# Patient Record
Sex: FEMALE | Race: WHITE | NOT HISPANIC OR LATINO | Employment: UNEMPLOYED
[De-identification: names, ages, dates, MRNs, and addresses within clinical notes are randomized per-mention and may not be internally consistent; named-entity substitution may affect disease eponyms.]

---

## 2017-10-15 ENCOUNTER — HEALTH MAINTENANCE LETTER (OUTPATIENT)
Age: 52
End: 2017-10-15

## 2020-02-24 ENCOUNTER — HEALTH MAINTENANCE LETTER (OUTPATIENT)
Age: 55
End: 2020-02-24

## 2020-10-20 ENCOUNTER — AMBULATORY - HEALTHEAST (OUTPATIENT)
Dept: CARDIOLOGY | Facility: CLINIC | Age: 55
End: 2020-10-20

## 2020-10-22 ENCOUNTER — OFFICE VISIT - HEALTHEAST (OUTPATIENT)
Dept: CARDIOLOGY | Facility: CLINIC | Age: 55
End: 2020-10-22

## 2020-10-22 ENCOUNTER — COMMUNICATION - HEALTHEAST (OUTPATIENT)
Dept: TELEHEALTH | Facility: CLINIC | Age: 55
End: 2020-10-22

## 2020-10-22 DIAGNOSIS — I49.3 PVC (PREMATURE VENTRICULAR CONTRACTION): ICD-10-CM

## 2020-10-22 DIAGNOSIS — I34.1 MITRAL VALVE PROLAPSE: ICD-10-CM

## 2020-10-22 RX ORDER — SUMATRIPTAN 25 MG/1
25 TABLET, FILM COATED ORAL
Status: SHIPPED | COMMUNITY
Start: 2020-07-22

## 2020-10-22 ASSESSMENT — MIFFLIN-ST. JEOR: SCORE: 1171.95

## 2020-10-26 ENCOUNTER — HOSPITAL ENCOUNTER (OUTPATIENT)
Dept: CARDIOLOGY | Facility: HOSPITAL | Age: 55
Discharge: HOME | End: 2020-10-26
Attending: INTERNAL MEDICINE

## 2020-10-29 ENCOUNTER — COMMUNICATION - HEALTHEAST (OUTPATIENT)
Dept: CARDIOLOGY | Facility: CLINIC | Age: 55
End: 2020-10-29

## 2020-10-30 ENCOUNTER — AMBULATORY - HEALTHEAST (OUTPATIENT)
Dept: CARDIOLOGY | Facility: CLINIC | Age: 55
End: 2020-10-30

## 2020-10-30 DIAGNOSIS — I49.3 PVC (PREMATURE VENTRICULAR CONTRACTION): ICD-10-CM

## 2020-10-30 DIAGNOSIS — R06.00 DYSPNEA: ICD-10-CM

## 2020-11-02 ENCOUNTER — HOSPITAL ENCOUNTER (OUTPATIENT)
Dept: CARDIOLOGY | Facility: CLINIC | Age: 55
Discharge: HOME | End: 2020-11-02
Attending: INTERNAL MEDICINE

## 2020-11-02 DIAGNOSIS — R06.00 DYSPNEA: ICD-10-CM

## 2020-11-02 DIAGNOSIS — I49.3 PVC (PREMATURE VENTRICULAR CONTRACTION): ICD-10-CM

## 2020-11-02 LAB
CV STRESS CURRENT BP HE: NORMAL
CV STRESS CURRENT HR HE: 104
CV STRESS CURRENT HR HE: 104
CV STRESS CURRENT HR HE: 108
CV STRESS CURRENT HR HE: 108
CV STRESS CURRENT HR HE: 109
CV STRESS CURRENT HR HE: 110
CV STRESS CURRENT HR HE: 110
CV STRESS CURRENT HR HE: 113
CV STRESS CURRENT HR HE: 117
CV STRESS CURRENT HR HE: 118
CV STRESS CURRENT HR HE: 118
CV STRESS CURRENT HR HE: 124
CV STRESS CURRENT HR HE: 125
CV STRESS CURRENT HR HE: 132
CV STRESS CURRENT HR HE: 133
CV STRESS CURRENT HR HE: 134
CV STRESS CURRENT HR HE: 140
CV STRESS CURRENT HR HE: 141
CV STRESS CURRENT HR HE: 148
CV STRESS CURRENT HR HE: 153
CV STRESS CURRENT HR HE: 153
CV STRESS CURRENT HR HE: 157
CV STRESS CURRENT HR HE: 167
CV STRESS CURRENT HR HE: 171
CV STRESS CURRENT HR HE: 80
CV STRESS CURRENT HR HE: 83
CV STRESS CURRENT HR HE: 97
CV STRESS DEVIATION TIME HE: NORMAL
CV STRESS ECHO PERCENT HR HE: NORMAL
CV STRESS EXERCISE STAGE HE: NORMAL
CV STRESS FINAL RESTING BP HE: NORMAL
CV STRESS FINAL RESTING HR HE: 97
CV STRESS MAX HR HE: 173
CV STRESS MAX TREADMILL GRADE HE: 14
CV STRESS MAX TREADMILL SPEED HE: 3.4
CV STRESS PEAK DIA BP HE: NORMAL
CV STRESS PEAK SYS BP HE: NORMAL
CV STRESS PHASE HE: NORMAL
CV STRESS PROTOCOL HE: NORMAL
CV STRESS RESTING PT POSITION HE: NORMAL
CV STRESS RESTING PT POSITION HE: NORMAL
CV STRESS ST DEVIATION AMOUNT HE: NORMAL
CV STRESS ST DEVIATION ELEVATION HE: NORMAL
CV STRESS ST EVELATION AMOUNT HE: NORMAL
CV STRESS TEST TYPE HE: NORMAL
CV STRESS TOTAL STAGE TIME MIN 1 HE: NORMAL
ECHO EJECTION FRACTION ESTIMATED: 60 %
RATE PRESSURE PRODUCT: NORMAL
STRESS ECHO BASELINE DIASTOLIC HE: 91
STRESS ECHO BASELINE HR: 82
STRESS ECHO BASELINE SYSTOLIC BP: 133
STRESS ECHO CALCULATED PERCENT HR: 105 %
STRESS ECHO LAST STRESS DIASTOLIC BP: 84
STRESS ECHO LAST STRESS HR: 171
STRESS ECHO LAST STRESS SYSTOLIC BP: 150
STRESS ECHO POST ESTIMATED WORKLOAD: 9.7
STRESS ECHO POST EXERCISE DUR MIN: 8
STRESS ECHO POST EXERCISE DUR SEC: 4
STRESS ECHO TARGET HR: 165

## 2020-11-05 ENCOUNTER — AMBULATORY - HEALTHEAST (OUTPATIENT)
Dept: CARDIOLOGY | Facility: CLINIC | Age: 55
End: 2020-11-05

## 2020-11-05 DIAGNOSIS — I49.3 PVC'S (PREMATURE VENTRICULAR CONTRACTIONS): ICD-10-CM

## 2020-11-09 ENCOUNTER — COMMUNICATION - HEALTHEAST (OUTPATIENT)
Dept: CARDIOLOGY | Facility: CLINIC | Age: 55
End: 2020-11-09

## 2020-11-30 ENCOUNTER — OFFICE VISIT - HEALTHEAST (OUTPATIENT)
Dept: CARDIOLOGY | Facility: CLINIC | Age: 55
End: 2020-11-30

## 2020-11-30 DIAGNOSIS — I49.3 FREQUENT UNIFOCAL PREMATURE VENTRICULAR CONTRACTIONS: ICD-10-CM

## 2020-11-30 DIAGNOSIS — I34.1 MITRAL VALVE POSTERIOR LEAFLET PROLAPSE: ICD-10-CM

## 2020-12-13 ENCOUNTER — HEALTH MAINTENANCE LETTER (OUTPATIENT)
Age: 55
End: 2020-12-13

## 2021-04-17 ENCOUNTER — HEALTH MAINTENANCE LETTER (OUTPATIENT)
Age: 56
End: 2021-04-17

## 2021-05-10 ENCOUNTER — COMMUNICATION - HEALTHEAST (OUTPATIENT)
Dept: CARDIOLOGY | Facility: CLINIC | Age: 56
End: 2021-05-10

## 2021-05-10 DIAGNOSIS — I49.3 PVC'S (PREMATURE VENTRICULAR CONTRACTIONS): ICD-10-CM

## 2021-05-10 RX ORDER — METOPROLOL SUCCINATE 25 MG/1
TABLET, EXTENDED RELEASE ORAL
Qty: 30 TABLET | Refills: 5 | Status: SHIPPED | OUTPATIENT
Start: 2021-05-10 | End: 2022-09-16

## 2021-06-01 ENCOUNTER — RECORDS - HEALTHEAST (OUTPATIENT)
Dept: ADMINISTRATIVE | Facility: CLINIC | Age: 56
End: 2021-06-01

## 2021-06-04 VITALS
SYSTOLIC BLOOD PRESSURE: 132 MMHG | WEIGHT: 134 LBS | HEIGHT: 63 IN | RESPIRATION RATE: 12 BRPM | DIASTOLIC BLOOD PRESSURE: 84 MMHG | BODY MASS INDEX: 23.74 KG/M2 | HEART RATE: 64 BPM

## 2021-06-12 NOTE — TELEPHONE ENCOUNTER
Rec'd return call from patient who stated she has noted that her cellulite dimples on her legs and lower abd seem to be decreasing which she had mentioned to  but it was a good report because she is pleased about it.  Patient does no weigh herself daily and does not feel she has gained wt and denies dyspnea, dizziness, increased fatigue after starting Metoprolol Succ - confirmed EP consult on 11-30-20 - patient agreed to call during interim if she experiences any sx that were previously reviewed.  mg

## 2021-06-12 NOTE — TELEPHONE ENCOUNTER
----- Message from Yvonne Carmona V sent at 11/9/2020  3:19 PM CST -----  Regarding: METOPROLOL  I spoke with Justine today to schedule a consult with Dr. Mack per Dr. Yusuf.  During our conversation she brought up that since she has taking her metoprolol she has has lost some water weight and cannot see the dimples on her thighs. She said that she has not been urinating more than usual.  She wanted to make sure that it was nothing to be concerned about.  I told her I would reach out to Dr. Yusuf's nurse and that she would reach out to her if it was something to be concerned about.

## 2021-06-13 NOTE — PROGRESS NOTES
Review of systems are within normal limits.    Feels much better since starting Metoprolol, not feeling palpitations anymore.    Unable to check b/p

## 2021-06-13 NOTE — PATIENT INSTRUCTIONS - HE
Justine Viera    It was a pleasure to see you today for a clinic visit.    You have frequent ventricular premature beats from the left ventricular outflow tract which are not serious or life-threatening.  Symptoms have responded well to low-dose metoprolol.    Continue metoprolol succinate 25 mg daily for 3 months.  If symptoms are well controlled you can stop or taper metoprolol at that time.  If symptoms recur I would recommend restarting metoprolol.  An echocardiogram is ordered in 1 year to recheck pumping function  Follow up appointment: Dr. Mack 1 week after echo test    Contact Dalila at [phone] with questions or concerns.    Vince Mack MD

## 2021-06-16 PROBLEM — I34.1 MITRAL VALVE POSTERIOR LEAFLET PROLAPSE: Status: ACTIVE | Noted: 2020-11-30

## 2021-06-16 PROBLEM — I49.3 FREQUENT UNIFOCAL PREMATURE VENTRICULAR CONTRACTIONS: Status: ACTIVE | Noted: 2020-11-30

## 2021-06-29 NOTE — PROGRESS NOTES
Progress Notes by Brooke Yusuf MD at 10/22/2020  2:00 PM     Author: Brooke Yusuf MD Service: -- Author Type: Physician    Filed: 10/22/2020  2:48 PM Encounter Date: 10/22/2020 Status: Signed    : Brooke Yusuf MD (Physician)           Thank you, Dr. Oro, for asking us to see Justine Viera at the [facility].      Assessment/Recommendations   Assessment:    1.  Mitral valve prolapse/mitral regurgitation: Mild on last assessment 2016  2.  Symptomatic premature ventricular contractions  3.  Paroxysmal SVT: History of SVT back in 2013 EP study was unable to induce arrhythmia.  Reported either AF or A. tach.    Plan:  1.  Echocardiogram to reevaluate mitral valve disease and left ventricular ejection fraction  2.  24-hour Holter monitor to evaluate burden of PVCs  3.  Follow-up in a year       History of Present Illness    Ms. Justine Viera is a 55 y.o. female with history of symptomatic premature ventricular contractions, atrial tachycardia/A. fib, mild posterior leaflet mitral valve prolapse/mild mitral regurgitation who is here to establish care.  About a month ago her family developed URI symptoms.  They were Covid negative.  Following this over the past 2 weeks she is now felt recurrent palpitations and shortness of breath at times.  This feels similar to her symptoms that she had a few years ago when she underwent evaluation.  In 2013 she underwent an EP study by .  They were unable to induce VT or SVT.  He reviewed the Holter and reported that he felt that it was most consistent with A. fib or atrial tachycardia.  In 2016 she underwent an echocardiogram and a cardiac MRI which showed preserved left ventricular systolic function with myxomatous thickened mitral valve leaflets with mild posterior leaflet mitral valve prolapse and mild mitral regurgitation.  She was last seen by Dr. Aguilar in 2017.    She reports that more recently  she has developed irregular skipped heartbeats consistent with her symptoms of PVCs that occur on a daily basis.  She attributes her palpitations to intake of vitamin D, fish oil and caffeine which she had cut out.  She ate salmon and Mehran and felt that this has caused her recurrence recently.  When she exercises she reports that this helps with her symptoms.  No chest pain.  The skipped heartbeats she is feeling is consistent with PVCs.  She has not had palpitations consistent with her SVT for many years now.       Physical Examination Review of Systems   Vitals:    10/22/20 1408   BP: 132/84   Pulse: 64   Resp: 12     Body mass index is 23.74 kg/m .  Wt Readings from Last 3 Encounters:   10/22/20 134 lb (60.8 kg)       General Appearance:   alert, no apparent distress   HEENT:  no scleral icterus; the mucous membranes are pink and moist                                  Neck:  No JVD   Chest: the spine is straight and the chest is symmetric   Lungs:   respirations unlabored; the lungs are clear to auscultation   Cardiovascular:   regular rhythm with normal first and second heart sounds and no murmurs or gallops   Abdomen:  no organomegaly, masses, bruits, or tenderness; bowel sounds are present   Extremities: no cyanosis, clubbing, or edema   Skin: no xanthelasma    General: WNL  Eyes: WNL  Ears/Nose/Throat: WNL  Lungs: Cough, Shortness of Breath  Heart: Irregular Heartbeat  Stomach: WNL  Bladder: WNL  Muscle/Joints: WNL  Skin: WNL  Nervous System: WNL  Mental Health: WNL     Blood: WNL     Medical History  Surgical History Family History Social History   History of atrial tachycardia versus atrial fibrillation    Symptomatic PVCs    Mild posterior mitral valve prolapse with mild mitral regurgitation    Migraines     No past surgical history on file.   No family history of premature coronary artery disease Social History     Socioeconomic History   ? Marital status:      Spouse name: Not on file   ? Number  of children: Not on file   ? Years of education: Not on file   ? Highest education level: Not on file   Occupational History   ? Not on file   Social Needs   ? Financial resource strain: Not on file   ? Food insecurity     Worry: Not on file     Inability: Not on file   ? Transportation needs     Medical: Not on file     Non-medical: Not on file   Tobacco Use   ? Smoking status: Never Smoker   ? Smokeless tobacco: Never Used   Substance and Sexual Activity   ? Alcohol use: Not on file   ? Drug use: Not on file   ? Sexual activity: Not on file   Lifestyle   ? Physical activity     Days per week: Not on file     Minutes per session: Not on file   ? Stress: Not on file   Relationships   ? Social connections     Talks on phone: Not on file     Gets together: Not on file     Attends Oriental orthodox service: Not on file     Active member of club or organization: Not on file     Attends meetings of clubs or organizations: Not on file     Relationship status: Not on file   ? Intimate partner violence     Fear of current or ex partner: Not on file     Emotionally abused: Not on file     Physically abused: Not on file     Forced sexual activity: Not on file   Other Topics Concern   ? Not on file   Social History Narrative   ? Not on file          Medications  Allergies   Current Outpatient Medications   Medication Sig Dispense Refill   ? SUMAtriptan (IMITREX) 25 MG tablet TK 1 T PO BID PRN FOR MIGRAINE. GIVE AT MINIMUM 2 HOURS APART. MAX DOSE 200 MG PER 24 HOURS.       No current facility-administered medications for this visit.       Allergies   Allergen Reactions   ? Erythromycin Base    ? Magnesium Sulfate    ? Other Allergy (See Comments) Other (See Comments)     Magnesium sulfate (given during  labor.)   ? Terbutaline Other (See Comments)         Lab Results    Chemistry/lipid CBC Cardiac Enzymes/BNP/TSH/INR   Lab Results   Component Value Date    CHOL 161 2015    HDL 54 2015    LDLCALC 88 2015    TRIG  95 01/13/2015    CREATININE 0.92 06/16/2016    BUN 14 06/16/2016    K 4.6 06/16/2016     06/16/2016     06/16/2016    CO2 26 06/16/2016    No results found for: WBC, HGB, HCT, MCV, PLT Lab Results   Component Value Date    TSH 0.98 06/16/2016

## 2021-06-30 NOTE — PROGRESS NOTES
"Progress Notes by Vince Mack MD at 11/30/2020 10:10 AM     Author: Vince Mack MD Service: -- Author Type: Physician    Filed: 11/30/2020 11:03 AM Encounter Date: 11/30/2020 Status: Signed    : Vince Mack MD (Physician)           The patient has been notified of following:     \"This video visit will be conducted via a call between you and your physician/provider. We have found that certain health care needs can be provided without the need for an in-person physical exam.  This service lets us provide the care you need with a video conversation.  If a prescription is necessary we can send it directly to your pharmacy.  If lab work is needed we can place an order for that and you can then stop by our lab to have the test done at a later time.      Patient has given verbal consent to a Video visit? Yes    HEART CARE VIDEO ENCOUNTER        The patient has chosen to have the visit conducted as a video visit, to reduce risk of exposure given the current status of Coronavirus in our community. This video visit is being conducted via a call between the patient and physician/provider. Health care needs are being provided without a physical exam.     Assessment/Recommendations   Clinic Problem List:  1. Frequent unifocal premature ventricular contractions  Echo Complete   2. Mitral valve posterior leaflet prolapse  Echo Complete       Assessment:      Frequent unifocal ventricular premature beats probably left ventricular outflow tract origin near the right coronary cusp.  Excellent suppression of symptoms with low-dose metoprolol and no evidence for PVC induced cardiomyopathy    Mitral valve prolapse with mild mitral insufficiency    Plan:  Continue metoprolol succinate 25 mg daily for 3 months.  If symptoms are well controlled you can stop or taper metoprolol at that time.  If symptoms recur I would recommend restarting metoprolol.  An echocardiogram is ordered in 1 year to recheck pumping " function  Follow up appointment: Dr. Mack 1 week after echo test    I have reviewed the note as documented.  This accurately captures the substance of my conversation with the patient.    Total time of video between patient and provider was 24 minutes   Start Time: 10:20  Stop Time: 10;44    Originating Location (pt. Location): Home    Distant Location (provider location):  [facility]     Mode of Communication:  Video Conference via [email or web address]       History of Present Illness/Subjective    Justine Viera is a 55 y.o. female who is being evaluated via a billable video visit and has consented to a video visit. Justine Viera has a history of palpitations in 2013 and was suspected to have supraventricular tachycardia.  He had EP studies at [facility].  There was no inducible SVT and she was treated with atenolol for some time with good results.  Cardiac echo in June 2016 showed normal left ventricular function and a myxomatous mitral valve with mild mitral insufficiency.  Iliac MRI September 2016 showed normal left ventricular function and no significant gallium enhancement with thickened mitral valve and prolapse of the posterior leaflet with mild mitral insufficiency.  She had some recurrent palpitations in 2017.  Holter monitor at that time showed occasional ventricular premature beats 484/24 hours associated with symptoms of palpitations.  This summer she began to experience more palpitations with strong and forceful heart beating skipping and some faster heart beating which could last a few seconds.  There may have been some symptoms of fatigue associated with this.  There was no lightheadedness syncope or presyncope.  She believed some of her symptoms may be related to eating seafood.  Holter monitor October 26, 2020 showed 17,000 PVCs over 24 hours accounting for 15.8% of all heartbeats.  There were short relatively slow runs of nonsustained ventricular tachycardia PVC  morphology had a right bundle configuration and inferior axis suggesting left ventricular outflow tract ectopy.  Stress echo October 30, 2020 showed ejection fraction of 60% with mild mitral valve prolapse.  She had moderately frequent PVCs and short runs of nonsustained ventricular tachycardia average rate 120 bpm PVCs had a left bundle configuration transition in V3 and right axis deviation consistent with ectopy from the left ventricular outflow tract probably near the right coronary cusp.  She is started on metoprolol 25 succinate 25 mg daily after the Holter.  Once that time her sense of palpitations and irregular heart beating have decreased markedly.  She has noted some improvement in energy level with less fatigue.  She denies any exercise intolerance.  There is no exertional chest pain dyspnea on exertion syncope or presyncope.      I have reviewed and updated the patient's Past Medical History, Social History, Family History and Medication List.     Physical Examination performed via live video encounter Review of Systems   General Appearance:   no distress, normal body habitus, upright.   ENT/Mouth: membranes moist, no nasal discharge or bleeding gums.  Normal head shape, no evidence of injury or laceration.     EYES:  no scleral icterus, normal conjunctivae   Neck: no evidence of thyromegaly.  Supple   Chest/Lungs:   No audible wheezing equal chest wall expansion. Non labored breathing.  No cough.   Cardiovascular:   No evidence of elevated jugular venous pressure.  No evidence of pitting edema bilaterally    Abdomen:  no evidence of abdominal distention. No observe juandice.     Extremities: no cyanosis or clubbing noted.    Skin: no xanthelasma, normal skin collar. No evidence of facial lacerations.      Neurologic: Normal arm motion bilateral, no tremors.  No evidence of focal defect.       Psychiatric: alert and oriented x3, calm                                               Medical History  Surgical  History Family History Social History   No past medical history on file. No past surgical history on file. Family History   Problem Relation Age of Onset   ? Acute Myocardial Infarction Father 47      Social History     Socioeconomic History   ? Marital status:      Spouse name: Not on file   ? Number of children: Not on file   ? Years of education: Not on file   ? Highest education level: Not on file   Occupational History   ? Not on file   Social Needs   ? Financial resource strain: Not on file   ? Food insecurity     Worry: Not on file     Inability: Not on file   ? Transportation needs     Medical: Not on file     Non-medical: Not on file   Tobacco Use   ? Smoking status: Never Smoker   ? Smokeless tobacco: Never Used   Substance and Sexual Activity   ? Alcohol use: Not on file   ? Drug use: Not on file   ? Sexual activity: Not on file   Lifestyle   ? Physical activity     Days per week: Not on file     Minutes per session: Not on file   ? Stress: Not on file   Relationships   ? Social connections     Talks on phone: Not on file     Gets together: Not on file     Attends Anabaptist service: Not on file     Active member of club or organization: Not on file     Attends meetings of clubs or organizations: Not on file     Relationship status: Not on file   ? Intimate partner violence     Fear of current or ex partner: Not on file     Emotionally abused: Not on file     Physically abused: Not on file     Forced sexual activity: Not on file   Other Topics Concern   ? Not on file   Social History Narrative   ? Not on file          Medications  Allergies   Current Outpatient Medications   Medication Sig Dispense Refill   ? metoprolol succinate (TOPROL-XL) 25 MG Take 1 tablet (25 mg total) by mouth daily. 30 tablet 5   ? SUMAtriptan (IMITREX) 25 MG tablet TK 1 T PO BID PRN FOR MIGRAINE. GIVE AT MINIMUM 2 HOURS APART. MAX DOSE 200 MG PER 24 HOURS.       No current facility-administered medications for this visit.      Allergies   Allergen Reactions   ? Erythromycin Base    ? Magnesium Sulfate    ? Other Allergy (See Comments) Other (See Comments)     Magnesium sulfate (given during  labor.)   ? Terbutaline Other (See Comments)         Lab Results    Chemistry/lipid CBC Cardiac Enzymes/BNP/TSH/INR   Lab Results   Component Value Date    CHOL 161 2015    HDL 54 2015    LDLCALC 88 2015    TRIG 95 2015    CREATININE 0.92 2016    BUN 14 2016    K 4.6 2016     2016     2016    CO2 26 2016    No results found for: WBC, HGB, HCT, MCV, PLT Lab Results   Component Value Date    TSH 0.98 2016

## 2021-09-26 ENCOUNTER — HEALTH MAINTENANCE LETTER (OUTPATIENT)
Age: 56
End: 2021-09-26

## 2021-10-12 ENCOUNTER — TRANSFERRED RECORDS (OUTPATIENT)
Dept: HEALTH INFORMATION MANAGEMENT | Facility: CLINIC | Age: 56
End: 2021-10-12
Payer: COMMERCIAL

## 2021-12-15 ENCOUNTER — LAB REQUISITION (OUTPATIENT)
Dept: LAB | Facility: CLINIC | Age: 56
End: 2021-12-15

## 2021-12-15 DIAGNOSIS — C54.1 MALIGNANT NEOPLASM OF ENDOMETRIUM (H): ICD-10-CM

## 2021-12-15 LAB
ANION GAP SERPL CALCULATED.3IONS-SCNC: 12 MMOL/L (ref 5–18)
BUN SERPL-MCNC: 15 MG/DL (ref 8–22)
CALCIUM SERPL-MCNC: 9.8 MG/DL (ref 8.5–10.5)
CHLORIDE BLD-SCNC: 103 MMOL/L (ref 98–107)
CO2 SERPL-SCNC: 25 MMOL/L (ref 22–31)
CREAT SERPL-MCNC: 0.66 MG/DL (ref 0.6–1.1)
GFR SERPL CREATININE-BSD FRML MDRD: >90 ML/MIN/1.73M2
GLUCOSE BLD-MCNC: 81 MG/DL (ref 70–125)
POTASSIUM BLD-SCNC: 4.5 MMOL/L (ref 3.5–5)
SODIUM SERPL-SCNC: 140 MMOL/L (ref 136–145)

## 2021-12-15 PROCEDURE — 80048 BASIC METABOLIC PNL TOTAL CA: CPT | Performed by: FAMILY MEDICINE

## 2021-12-16 ENCOUNTER — LAB REQUISITION (OUTPATIENT)
Dept: LAB | Facility: CLINIC | Age: 56
End: 2021-12-16
Payer: COMMERCIAL

## 2021-12-16 DIAGNOSIS — Z03.818 ENCOUNTER FOR OBSERVATION FOR SUSPECTED EXPOSURE TO OTHER BIOLOGICAL AGENTS RULED OUT: ICD-10-CM

## 2021-12-16 LAB — SARS-COV-2 RNA RESP QL NAA+PROBE: NEGATIVE

## 2021-12-16 PROCEDURE — U0005 INFEC AGEN DETEC AMPLI PROBE: HCPCS | Mod: ORL | Performed by: FAMILY MEDICINE

## 2022-03-13 ENCOUNTER — HEALTH MAINTENANCE LETTER (OUTPATIENT)
Age: 57
End: 2022-03-13

## 2022-03-17 ENCOUNTER — TRANSFERRED RECORDS (OUTPATIENT)
Dept: HEALTH INFORMATION MANAGEMENT | Facility: CLINIC | Age: 57
End: 2022-03-17
Payer: COMMERCIAL

## 2022-04-28 ENCOUNTER — PATIENT OUTREACH (OUTPATIENT)
Dept: ONCOLOGY | Facility: CLINIC | Age: 57
End: 2022-04-28

## 2022-04-29 ENCOUNTER — TRANSCRIBE ORDERS (OUTPATIENT)
Dept: OTHER | Age: 57
End: 2022-04-29
Payer: COMMERCIAL

## 2022-04-29 DIAGNOSIS — Z85.42 H/O MALIGNANT NEOPLASM OF ENDOMETRIUM: Primary | ICD-10-CM

## 2022-05-03 ENCOUNTER — DOCUMENTATION ONLY (OUTPATIENT)
Dept: ONCOLOGY | Facility: CLINIC | Age: 57
End: 2022-05-03
Payer: COMMERCIAL

## 2022-05-03 NOTE — PROGRESS NOTES
Action May 3, 2022 9:58 AM ABT   Action Taken Called and spoke to patient, she states that she will sign SMITHA via [facility] portal and get recs to us.    Radiation done/ MR & CT  HCR Radiation finished the end of Feb 2022  Surgery 12/21-  Complete hysterecop    4:05 PM  Image report from [facility] received and sent to HIM for upload. Image from [facility] received and resolved to PACS.     Records from [facility] received and sent to HIM for upload

## 2022-05-03 NOTE — PROGRESS NOTES
Pt left message on voice mail  Wants appt with dr orellana  Hx of endometrial cancer  Needs oncology follow up

## 2022-05-04 ENCOUNTER — PATIENT OUTREACH (OUTPATIENT)
Dept: ONCOLOGY | Facility: CLINIC | Age: 57
End: 2022-05-04
Payer: COMMERCIAL

## 2022-05-04 NOTE — TELEPHONE ENCOUNTER
RECORDS STATUS - ALL OTHER DIAGNOSIS      RECORDS RECEIVED FROM: SANTY Ribeiro, Senthil Balderas [facility]   DATE RECEIVED:    NOTES STATUS DETAILS   OFFICE NOTE from referring provider Self, [facility], CE - Senthil Balderas  (OBGYN)Dr. Aruna Bales: 21  (OBGYN) Milady Miller NP: 11/3/21    (Family Medicine) Dr. Tracy Oro: 21    Radiation Records - Media Tab  3/21/22   OFFICE NOTE from medical oncologist [facility], CE - [place] SANTY Webber: 3/17/22    [place]  Dr. Nilo Green: 22   OPERATIVE REPORT CE - Andrey 21: ROBOTIC ASSISTED TOTAL LAPAROSCOPIC HYSTERECTOMY, BILATERAL SALPINGO OOPHORECTOMY, SENTINEL LYMPH NODE BIOPSY, POSSIBLE MAN    21: Endometrial Bx   MEDICATION LIST CE Andrey   LABS     PATHOLOGY REPORTS Andrey, Report in CE, slides requested   [employer] 21: [id number]  21: [id number]   ANYTHING RELATED TO DIAGNOSIS     IMAGING (NEED IMAGES & REPORT)     CT SCANS PACS 10/12/21: [facility]    22, 22: Andrey   ULTRASOUND PACS 22, 21: Andrey

## 2022-05-06 NOTE — TELEPHONE ENCOUNTER
Records received 05/06/22    Facility  [facility], Central Laboratory - [address], [place] [address]    Outcome Received path, sent for consult:  12/20/21: [id number] (14 slides)   12/1/21: [id number]  (8 slides)

## 2022-05-08 ENCOUNTER — HEALTH MAINTENANCE LETTER (OUTPATIENT)
Age: 57
End: 2022-05-08

## 2022-05-09 ENCOUNTER — PRE VISIT (OUTPATIENT)
Dept: ONCOLOGY | Facility: CLINIC | Age: 57
End: 2022-05-09
Payer: COMMERCIAL

## 2022-05-09 ENCOUNTER — LAB (OUTPATIENT)
Dept: LAB | Facility: CLINIC | Age: 57
End: 2022-05-09
Payer: COMMERCIAL

## 2022-05-09 ENCOUNTER — ONCOLOGY VISIT (OUTPATIENT)
Dept: ONCOLOGY | Facility: CLINIC | Age: 57
End: 2022-05-09
Payer: COMMERCIAL

## 2022-05-09 VITALS
DIASTOLIC BLOOD PRESSURE: 77 MMHG | HEIGHT: 63 IN | RESPIRATION RATE: 16 BRPM | TEMPERATURE: 98.2 F | BODY MASS INDEX: 22.15 KG/M2 | OXYGEN SATURATION: 100 % | SYSTOLIC BLOOD PRESSURE: 117 MMHG | HEART RATE: 64 BPM | WEIGHT: 125 LBS

## 2022-05-09 DIAGNOSIS — Z85.42 H/O MALIGNANT NEOPLASM OF ENDOMETRIUM: ICD-10-CM

## 2022-05-09 DIAGNOSIS — C54.1 ENDOMETRIAL CARCINOMA (H): Primary | ICD-10-CM

## 2022-05-09 PROCEDURE — 99204 OFFICE O/P NEW MOD 45 MIN: CPT | Performed by: OBSTETRICS & GYNECOLOGY

## 2022-05-09 PROCEDURE — G0463 HOSPITAL OUTPT CLINIC VISIT: HCPCS

## 2022-05-09 PROCEDURE — 88321 CONSLTJ&REPRT SLD PREP ELSWR: CPT | Mod: GC | Performed by: PATHOLOGY

## 2022-05-09 ASSESSMENT — PAIN SCALES - GENERAL: PAINLEVEL: NO PAIN (0)

## 2022-05-09 NOTE — PROGRESS NOTES
Consult Notes on Referred Patient        RE: Justine Viera  : 1965  JACKLYN: 2022    Dear  Referred Self:    I had the pleasure of seeing your patient Justine Viera here at the Gynecologic Cancer Clinic at the [facility] on 2022.  As you know she is a very pleasant 56 year old woman with a recent diagnosis of  Endometrial cancer s/p staging and RT.  Given these findings she was subsequently sent to the Gynecologic Cancer Clinic for new patient consultation.     She has been happy with her care to date, but her  has been having health issues and she would prefer that they both receive care here.      Review of Systems:    Systemic           no weight changes; no fever; no chills; no night sweats; no appetite changes  Skin           no rashes, or lesions  Eye           no irritation; no changes in vision  Gale-Laryngeal           no dysphagia; no hoarseness   Pulmonary    no cough; no shortness of breath  Cardiovascular    no chest pain; no palpitations  Gastrointestinal    no diarrhea; no constipation; no abdominal pain; no changes in bowel  habits; no blood in stool  Genitourinary   no urinary frequency; no urinary urgency; no dysuria; no pain; no abnormal vaginal discharge; no abnormal vaginal bleeding  Breast   no breast discharge; no breast changes; no breast pain  Musculoskeletal    no myalgias; no arthralgias; no back pain  Psychiatric           no depressed mood; no anxiety    Hematologic           no tender lymph nodes; no noticeable swellings or lumps   Endocrine    no hot flashes; no heat/cold intolerance         Neurological   no tremor; no numbness and tingling; no headaches; no difficulty  sleeping      Past Medical History:    No past medical history on file.      Past Surgical History:    No past surgical history on file.      Health Maintenance:  Health Maintenance Due   Topic Date Due     PREVENTIVE CARE VISIT  Never done     ADVANCE CARE  PLANNING  Never done     Pneumococcal Vaccine: Pediatrics (0 to 5 Years) and At-Risk Patients (6 to 64 Years) (1 - PCV) Never done     PAP  Never done     ZOSTER IMMUNIZATION (1 of 2) Never done     LIPID  01/13/2020     DTAP/TDAP/TD IMMUNIZATION (1 - Tdap) 07/17/2021     PHQ-2 (once per calendar year)  Never done         Current Medications:     has a current medication list which includes the following prescription(s): metoprolol succinate er and sumatriptan.       Allergies:     Erythromycin base [erythromycin], Magnesium sulfate, Other allergy (see comments) [external allergen needs reconciliation - see comment], and Terbutaline        Social History:     Social History     Tobacco Use     Smoking status: Never Smoker     Smokeless tobacco: Never Used   Substance Use Topics     Alcohol use: Not on file       History   Drug Use Not on file           Family History:     The patient's family history is notable for .    Family History   Problem Relation Age of Onset     Acute Myocardial Infarction Father 47.00         Physical Exam:     There were no vitals taken for this visit.  There is no height or weight on file to calculate BMI.    General Appearance: healthy and alert, no distress     HEENT:  no thyromegaly, no palpable nodules or masses        Cardiovascular: regular rate and rhythm, no gallops, rubs or murmurs     Respiratory: lungs clear, no rales, rhonchi or wheezes, normal diaphragmatic excursion    Musculoskeletal: extremities non tender and without edema    Skin: no lesions or rashes     Neurological: normal gait, no gross defects     Psychiatric: appropriate mood and affect                               Hematological: normal cervical, supraclavicular and inguinal lymph nodes     Gastrointestinal:       abdomen soft, non-tender, non-distended, no organomegaly or masses    Genitourinary: External genitalia and urethral meatus appears normal.  Vagina is smooth without nodularity or masses.  Cervix and  "uterus are surgically absent.  Bimanual exam reveal no masses, nodularity or fullness.  Recto-vaginal exam confirms these findings.      Assessment:    Justine Viera is a 56 year old woman with a established diagnosis of EMCA IBG1.     A total of 45 minutes was spent with the patient, 350 minutes of which were spent in counseling the patient and/or treatment planning.      Plan:     1.)   EMCA - There is no evidence of cancer on today\"s examination.  We have discussed routine follow-up and I have recommended q3-6 month intervals with pelvic examination.  WE have also discussed the signs and symptoms of recurrent cancer.      2.) Genetic risk factors were assessed and the patient does not meet the qualifications for a referral.      3.) Labs and/or tests ordered include:  none.     4.) Health maintenance issues addressed today include none.        Thank you for allowing us to participate in the care of your patient.         Sincerely,    Rikki Liu MD    CC  Patient Care Team:  Rikki Liu MD as PCP - General (Gynecologic Oncology)  Vince Mack MD as Assigned Heart and Vascular Provider  SELF, REFERRED    "

## 2022-05-09 NOTE — NURSING NOTE
"Oncology Rooming Note    May 19, 2022 11:22 AM   Justine Viera is a 56 year old female who presents for:    Chief Complaint   Patient presents with     Oncology Clinic Visit     Endometrial Ca     Initial Vitals: /77   Pulse 64   Temp 98.2  F (36.8  C) (Oral)   Resp 16   Ht 1.6 m (5' 3\")   Wt 56.7 kg (125 lb)   SpO2 100%   BMI 22.14 kg/m   Estimated body mass index is 22.14 kg/m  as calculated from the following:    Height as of this encounter: 1.6 m (5' 3\").    Weight as of this encounter: 56.7 kg (125 lb). Body surface area is 1.59 meters squared.  No Pain (0) Comment: Data Unavailable   No LMP recorded. Patient has had a hysterectomy.  Allergies reviewed: Yes  Medications reviewed: Yes    Medications: Medication refills not needed today.  Pharmacy name entered into [employer]: [id number] - [place], [place] - [address] AT [facility] [address]    Clinical concerns: New pt consult.      Malia Bales CMA                          "

## 2022-05-10 NOTE — PATIENT INSTRUCTIONS
It was a pleasure seeing you in clinic today-please reach out if there are any further questions that arise following today's visit.  During business hours, you may reach me at [phone].  For urgent/emergent questions after business hours, you may reach the on-call Gynecologic Oncology Resident through the [facility]  at [phone].    All normal test results are usually communicated via letter or TouchLocalhart message.  Abnormal results (those that require a change in the previously discussed plan of care) are usually communicated via a phone call.    I recommend signing up for tenXer access if you have not already done so.  This allows you online access to your lab results and also helps you communicate efficiently with your clinic should any questions arise in your care.    Follow up appointment in 6 months, scheduling will call you to help make an appointment  referral to, scheduling will call you to help make an appointment   done today, you will be notified via my chart/telephone with test results    Dr Alexa Kilgore RN  Phone:  [phone]  Fax:  [phone]  .

## 2022-05-12 LAB
PATH REPORT.COMMENTS IMP SPEC: ABNORMAL
PATH REPORT.COMMENTS IMP SPEC: YES
PATH REPORT.FINAL DX SPEC: ABNORMAL
PATH REPORT.GROSS SPEC: ABNORMAL
PATH REPORT.MICROSCOPIC SPEC OTHER STN: ABNORMAL
PATH REPORT.RELEVANT HX SPEC: ABNORMAL
PATH REPORT.RELEVANT HX SPEC: ABNORMAL
PATH REPORT.SITE OF ORIGIN SPEC: ABNORMAL

## 2022-05-19 NOTE — NURSING NOTE
Return 6 months   Crescentic Complex Repair Preamble Text (Leave Blank If You Do Not Want): Extensive wide undermining was performed.

## 2022-09-14 PROBLEM — C54.1 PRIMARY ENDOMETRIOID CARCINOMA OF ENDOMETRIUM OF UTERINE BODY (H): Status: ACTIVE | Noted: 2022-09-14

## 2022-09-14 NOTE — PROGRESS NOTES
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 She presents today for a survivorship visit.    HPI: Justine iVera comes to the clinic accompanied by her , Larry. She is feeling well. She completed treatment with brachytherapy on 22. She has questions regarding her pathology, needing to see a genetic counselor (for a teratoma found on her right ovary), a study she enrolled in with Andrey, and her follow up including recurrence concerns/location. She states her  has Oconnor as do her 3 children. She has never smoked. She states her and her  have been walking. She used her dilator 2-3 times per week following her radiation and then now is using it once a week for 10 minutes; she is intermittently sexually active.    Oncology history:     21:Endometrial biopsy: FIGO grade 1 endometrioid carcinoma. MMR proteins intact    21: Robotic TLH, BSO, sentinel nodes. Dr. Bernadette Webber.  A) SENTINEL LYMPH NODE, RIGHT PELVIC, BIOPSY:   1. Four lymph nodes, negative for malignancy (0/4)   2. Cytokeratin AE1/AE3 immunostains are negative for tumor cells     B) SENTINEL LYMPH NODE, LEFT PELVIC 1, BIOPSY:   1. Two lymph nodes, negative for malignancy (0/2)   2. Cytokeratin AE1/AE3 immunostains are negative for tumor cells     C) SENTINEL LYMPH NODE, LEFT PELVIC 2, BIOPSY:   1. One lymph node, negative for malignancy (0/1)   2. Cytokeratin AE1/AE3 immunostain is negative for tumor cells     D) UTERUS WITH CERVIX, OVARIES, AND FALLOPIAN TUBES, TOTAL HYSTERECTOMY WITH BILATERAL SALPINGO-OOPHORECTOMY:   1. Endometrioid carcinoma of the endometrium:      a. Histologic grade: FIGO Grade 1      b. Maximum tumor size: 2.3 cm      c. Myometrial invasion: Present (0.82 cm / 1.6 cm [51%])      d. Lower  uterine segment involvement: Absent      e. Cervical involvement: Absent      f. Uterine serosal involvement: Absent      g. Angiolymphatic invasion: Present, focal      h. Margins: Negative      i. DNA mismatch repair enzyme status by immunohistochemistry:         Previously performed, all intact (see [id number] for details)   2. Right and left ovaries and fallopian tubes are negative for carcinoma   3. Additional findings:      a. Cervix with no significant histologic abnormality      b. Focal superficial adenomyosis      c. Uterine weight: 58 grams      d. 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 Left ovary with benign inclusion cysts and focal adhesions      f. Fallopian tubes with benign paratubal cyst(s)   4. See synoptic section below for complete staging details    Stage IB FIGO grade 1 endometrioid carcinoma    Forming a 2.3 cm mass in the endometrium. Myometrial invasion present (depth of myometrial invasion8.2 mm; myometrial thickness 16 mm; 51% myometrial invasion). Uterine serosa involvement not identified. Cervical stromal involvement not identified. Lymphovascular invasion present. Bilateral ovaries and fallopian tubes are negative for carcinoma. Right ovary with mature cystic teratoma.    1/11/22  Dr. Bernadette Webber Recommended vaginal brachytherapy and observation per NCCN guidelines.  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  Primary GYN oncologist: Bernadette Webber MD  Primary radiation oncologist: Yarely Espinal MD  Surgery:  Robotic TLH, BSO, sentinel nodes 12/20/21  Chemotherapy: NA  Radiation: brachytherapy 2/17/22-2/25/22  Complications: none  Genetic testing: MMR proteins intact/normal      Health Maintenance:  Cervical cancer screening: no longer indicated  Mammogram: done 6/3/22  Colonoscopy: done 9/1/2020; due 10 years    Past Medical History:   Diagnosis Date     T  (supraventricular tachycardia) (H)        Past Surgical History:   Procedure Laterality Date     LAPAROSCOPY      cystectomy     SKIN CANCER EXCISION         Social History     Socioeconomic History     Marital status:      Spouse name: Not on file     Number of children: Not on file     Years of education: Not on file     Highest education level: Not on file   Occupational History     Not on file   Tobacco Use     Smoking status: Never Smoker     Smokeless tobacco: Never Used   Vaping Use     Vaping Use: Never used   Substance and Sexual Activity     Alcohol use: Never     Drug use: Never     Sexual activity: Not on file   Other Topics Concern     Not on file   Social History Narrative     Not on file     Social Determinants of Health     Financial Resource Strain: Not on file   Food Insecurity: Not on file   Transportation Needs: Not on file   Physical Activity: Not on file   Stress: Not on file   Social Connections: Not on file   Intimate Partner Violence: Not At Risk     Fear of Current or Ex-Partner: No     Emotionally Abused: No     Physically Abused: No     Sexually Abused: No   Housing Stability: Not on file       Family History   Problem Relation Age of Onset     Acute Myocardial Infarction Father 47     Prostate Cancer Father      Prostate Cancer Paternal Uncle      Colon Cancer No family hx of         no overy, uterus, breast         OBJECTIVE:    /70   Pulse 77   Temp 97.9  F (36.6  C) (Tympanic)   Resp 16   Wt 57.6 kg (127 lb)   SpO2 98%   BMI 22.50 kg/m      Constitutional: Alert and oriented non-toxic appearing female in no acute distress  Psychologic: Pleasant and interactive, euthymic affect, makes appropriate eye contact, judgment intact, linear thought pattern    ASSESSMENT/PLAN:  1) stage IB, 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 Discussed surveillance recommendations- to be seen by the nurse practitioner team every 3  months x 2 years (through 2/2024) then every 6 months x3 years (through 2/2027) with a pelvic/rectal exam. Discussed signs and symptoms of a recurrence, including but not limited to vaginal spotting/bleeding.    2) Post-surgical side effects: patient was already in menopause. She did have hot flashes initially in menopause, then after her surgery she has had some hot flashes at times.     3) Post-radiation (brachytherapy) side effects: discussed impact on sexual health and need for lubricants for intercourse. Discussed continuing to use her dilator (reviewed use) and that the frequency will be determined based off of how her exams go (ie discomfort or not being able to get an adequate exam of the vaginal cuff). For now, continue to use her dilator as she has been. She has an appointment scheduled next week and will be having a pelvic exam at that time.     4) Late effects: discussed effect of brachytherapy of stenosis (narrowing) or dryness.    5) Emotional well being: discussed psychology services or speaking/seeing a paster/ if she feels she needs to talk to someone.     6) Genetic counseling: her MMR proteins were intact and thus no Oconnor or need to see a genetic counselor. She recalls being told by her surgeon that given she had a teratoma on her ovary that she should see one; she has more information at home; encouraged to look this over and to further discuss with Dr. Dai next week. Also discussed she could see a genetic counselor and then together the decision can be made regarding any potential testing needing to be done.     7) Health maintenance: discussed importance of keeping current with her preventative health maintenance as well as having her eyes examined regularly and seeing a dentist regularly. .    Annual wellness exams: It is important that you establish care with a primary care provider and follow up for annual wellness exams as well as any other non-cancer related conditions you may  have.   Breast cancer screening: It is recommended to have yearly mammograms after the age of 40 Discuss with your primary care provider which option is appropriate for you. Monthly self breast exams are no longer recommended, however, it is important for you to have an awareness of your normal breast tissue in order to identify any concerning changes, such as changes in skin texture, nipple appearance, or lumps.   Colon cancer screening: It is important to screen for colon cancer after the age of 45 or earlier if there is a family history of colon cancer. There are different options, including yearly FIT testing, flexible sigmoidoscopy, and colonoscopy. Please discuss with your primary care provider which option is right for you.    Osteoporosis screening: to start at routine age of 65 unless she starts to have unexplained fractures.    8) Healthy lifestyle: discussed eating a healthy diet, staying physically active, using sun screen, wearing a seat belt and general safety.     9) Patient verbalized understanding of and agreement with plan. Her Cancer Treatment Plan and Summary was reviewed, questions answered, and she was given a copy of her survivorship plan.  Her treatment summary was sent to her [facility].       83 minutes spent on the date of the encounter doing chart review, history and exam, documentation and further activities as noted above    JONATAN Del Angel, WHNP-BC, ANP-BC  Women's Health Nurse Practitioner  Adult Nurse Practitioner  Division of Gynecologic Oncology

## 2022-09-16 ENCOUNTER — ONCOLOGY SURVIVORSHIP VISIT (OUTPATIENT)
Dept: ONCOLOGY | Facility: CLINIC | Age: 57
End: 2022-09-16
Attending: NURSE PRACTITIONER
Payer: COMMERCIAL

## 2022-09-16 VITALS
DIASTOLIC BLOOD PRESSURE: 70 MMHG | TEMPERATURE: 97.9 F | SYSTOLIC BLOOD PRESSURE: 109 MMHG | HEART RATE: 77 BPM | WEIGHT: 127 LBS | OXYGEN SATURATION: 98 % | RESPIRATION RATE: 16 BRPM | BODY MASS INDEX: 22.5 KG/M2

## 2022-09-16 DIAGNOSIS — C54.1 PRIMARY ENDOMETRIOID CARCINOMA OF ENDOMETRIUM OF UTERINE BODY (H): ICD-10-CM

## 2022-09-16 PROCEDURE — 99215 OFFICE O/P EST HI 40 MIN: CPT | Performed by: NURSE PRACTITIONER

## 2022-09-16 PROCEDURE — G0463 HOSPITAL OUTPT CLINIC VISIT: HCPCS

## 2022-09-16 PROCEDURE — 99417 PROLNG OP E/M EACH 15 MIN: CPT | Performed by: NURSE PRACTITIONER

## 2022-09-16 ASSESSMENT — PAIN SCALES - GENERAL: PAINLEVEL: NO PAIN (0)

## 2022-09-16 NOTE — NURSING NOTE
"Oncology Rooming Note    September 16, 2022 12:16 PM   Justine Viera is a 57 year old female who presents for:    Chief Complaint   Patient presents with     Oncology Clinic Visit     Endometrial cancer     Initial Vitals: /70   Pulse 77   Temp 97.9  F (36.6  C) (Tympanic)   Resp 16   Wt 57.6 kg (127 lb)   SpO2 98%   BMI 22.50 kg/m   Estimated body mass index is 22.5 kg/m  as calculated from the following:    Height as of 5/9/22: 1.6 m (5' 3\").    Weight as of this encounter: 57.6 kg (127 lb). Body surface area is 1.6 meters squared.  No Pain (0) Comment: Data Unavailable   No LMP recorded. Patient has had a hysterectomy.  Allergies reviewed: Yes  Medications reviewed: Yes    Medications: Medication refills not needed today.  Pharmacy name entered into [employer]: [facility] #[id number] - [place], [place] - [address] AT [facility] [address]    Clinical concerns: none       Alee William CMA            "

## 2022-09-21 ENCOUNTER — ONCOLOGY VISIT (OUTPATIENT)
Dept: ONCOLOGY | Facility: CLINIC | Age: 57
End: 2022-09-21
Attending: OBSTETRICS & GYNECOLOGY
Payer: COMMERCIAL

## 2022-09-21 VITALS
WEIGHT: 126 LBS | DIASTOLIC BLOOD PRESSURE: 72 MMHG | BODY MASS INDEX: 22.32 KG/M2 | OXYGEN SATURATION: 96 % | TEMPERATURE: 97.7 F | SYSTOLIC BLOOD PRESSURE: 108 MMHG | HEART RATE: 72 BPM

## 2022-09-21 DIAGNOSIS — C54.1 PRIMARY ENDOMETRIOID CARCINOMA OF ENDOMETRIUM OF UTERINE BODY (H): Primary | ICD-10-CM

## 2022-09-21 PROCEDURE — G0463 HOSPITAL OUTPT CLINIC VISIT: HCPCS

## 2022-09-21 PROCEDURE — 99213 OFFICE O/P EST LOW 20 MIN: CPT | Performed by: OBSTETRICS & GYNECOLOGY

## 2022-09-21 ASSESSMENT — PAIN SCALES - GENERAL: PAINLEVEL: NO PAIN (0)

## 2022-09-21 NOTE — LETTER
9/21/2022         RE: Justine Viera  [address] [address]        Dear Colleague,    Thank you for referring your patient, Justine Viera, to the [facility]. Please see a copy of my visit note below.    Gynecologic Oncology Clinic - Established Patient Visit    Visit date: Sep 21, 2022     Reason for visit: history of endometrial cancer    Interval history: Justine Viera is a 57 year old with a history of Stage 1B Grade 1 endometrial cancer treated with surgery and vaginal brachytherapy who presents for scheduled surveillance visit.    She reports she has been overall feeling well. She denies vaginal bleeding or new discharge. She is using dilator about once a week. She has no specific questions or concerns.     Does have a strong family history of cancer due to  undergoing treatment for prostate cancer ( and children have Oconnor Syndrome).    Oncology history:   12/1/21:Endometrial biopsy: FIGO grade 1 endometrioid carcinoma. MMR proteins intact    12/20/21: Robotic TLH, BSO, sentinel nodes. Dr. Bernadette Webber.  A) SENTINEL LYMPH NODE, RIGHT PELVIC, BIOPSY:   1. Four lymph nodes, negative for malignancy (0/4)   2. Cytokeratin AE1/AE3 immunostains are negative for tumor cells     B) SENTINEL LYMPH NODE, LEFT PELVIC 1, BIOPSY:   1. Two lymph nodes, negative for malignancy (0/2)   2. Cytokeratin AE1/AE3 immunostains are negative for tumor cells     C) SENTINEL LYMPH NODE, LEFT PELVIC 2, BIOPSY:   1. One lymph node, negative for malignancy (0/1)   2. Cytokeratin AE1/AE3 immunostain is negative for tumor cells     D) UTERUS WITH CERVIX, OVARIES, AND FALLOPIAN TUBES, TOTAL HYSTERECTOMY WITH BILATERAL SALPINGO-OOPHORECTOMY:   1. Endometrioid carcinoma of the endometrium:      a. Histologic grade: FIGO Grade 1      b. Maximum tumor size: 2.3 cm      c. Myometrial invasion: Present (0.82 cm / 1.6 cm [51%])      d. Lower uterine segment involvement: Absent      e.  Cervical involvement: Absent      f. Uterine serosal involvement: Absent      g. Angiolymphatic invasion: Present, focal      h. Margins: Negative      i. DNA mismatch repair enzyme status by immunohistochemistry:         Previously performed, all intact (see [id number] for details)   2. Right and left ovaries and fallopian tubes are negative for carcinoma   3. Additional findings:      a. Cervix with no significant histologic abnormality      b. Focal superficial adenomyosis      c. Uterine weight: 58 grams      d. 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 Left ovary with benign inclusion cysts and focal adhesions      f. Fallopian tubes with benign paratubal cyst(s)   4. See synoptic section below for complete staging details    Stage IB FIGO grade 1 endometrioid carcinoma    Forming a 2.3 cm mass in the endometrium. Myometrial invasion present (depth of myometrial invasion8.2 mm; myometrial thickness 16 mm; 51% myometrial invasion). Uterine serosa involvement not identified. Cervical stromal involvement not identified. Lymphovascular invasion present. Bilateral ovaries and fallopian tubes are negative for carcinoma. Right ovary with mature cystic teratoma.    1/11/22  Dr. Bernadette Webber Recommended vaginal brachytherapy and observation per NCCN guidelines.  2/11/22: 2nd opinion with [facility] recommended treatment    Review of Systems:  As per HPI, otherwise non-contributory.     Past Medical History:  Past Medical History:   Diagnosis Date     SVT (supraventricular tachycardia) (H)        Past Surgical History:  Past Surgical History:   Procedure Laterality Date     LAPAROSCOPY      cystectomy     SKIN CANCER EXCISION          Physical Exam:  /72   Pulse 72   Temp 97.7  F (36.5  C) (Oral)   Wt 57.2 kg (126 lb)   SpO2 96%   BMI 22.32 kg/m     General appearance: no acute distress, well groomed, sitting comfortably   GI: abdomen soft, non-tender, non-distended, no appreciable  masses  :  External: vulva/labia normal in appearance without lesions  Vagina: mucosa is pink, no lesions appreciated, vaginal cuff intact  Cervix: surgically absent  Uterus/adnexa: surgically absent uterus, no pelvic masses appreciated      Labs/Pathology:  See above.    Imaging review:  n/a    Assessment:  Justine is a 57 year old with a history of high-intermediate risk endometrial cancer treated with surgery and vaginal brachytherapy completed 2/2022 with no concerns for recurrence on exam today.     Plan:  - We reviewed various surveillance follow-up plans. Given her strong family history of cancer, she prefers more intensive surveillance schedule, which is reasonable. We can do a hybrid of the low/high risk group schedules from the ESMO guidelines, so we will plan to do every 3 mos surveillance visits for the first 2-3 years (12/20234193-3089) and then increase to 6 mos until 5 years from diagnosis (12/2026). We will plan to address follow-up plan at 2 years and we can make a shared decision about whether to increase surveillance interval to 6 mos or to stay at 3 months until 3 years from diagnosis.  - We reviewed signs/symptoms that should prompt evaluation between visits.   - Can discontinue dilator use.    I spent a total of 20 minutes on the care of Justine Viera on the day of service including 15 minutes face to face time and the remainder in chart review, care coordination, and documentation on the day of service.      Gregory Dai MD     Gynecologic Oncology

## 2022-09-21 NOTE — NURSING NOTE
"Oncology Rooming Note    September 21, 2022 3:38 PM   Justine Viera is a 57 year old female who presents for:    Chief Complaint   Patient presents with     Oncology Clinic Visit     Rtn for malignant neoplasm of endometrium     Initial Vitals: /72   Pulse 72   Temp 97.7  F (36.5  C) (Oral)   Wt 57.2 kg (126 lb)   SpO2 96%   BMI 22.32 kg/m   Estimated body mass index is 22.32 kg/m  as calculated from the following:    Height as of 5/9/22: 1.6 m (5' 3\").    Weight as of this encounter: 57.2 kg (126 lb). Body surface area is 1.59 meters squared.  No Pain (0) Comment: Data Unavailable   No LMP recorded. Patient has had a hysterectomy.  Allergies reviewed: Yes  Medications reviewed: Yes    Medications: Medication refills not needed today.  Pharmacy name entered into [employer]: [facility] #[id number] - [place], [place] - [address] AT [facility] OF [address]    Clinical concerns: Patient has no specific questions or clinical concerns outside of the reason for the visit.       Ashlyn Salas, EMT              "

## 2022-09-21 NOTE — PATIENT INSTRUCTIONS
It was a pleasure seeing you in clinic today-please reach out if there are any further questions that arise following today's visit.  During business hours, you may reach me at [phone].  For urgent/emergent questions after business hours, you may reach the on-call Gynecologic Oncology Resident through the [facility]  at [phone].    All normal test results are usually communicated via letter or Spero Therapeuticshart message.  Abnormal results (those that require a change in the previously discussed plan of care) are usually communicated via a phone call.    I recommend signing up for Peel-Works access if you have not already done so.  This allows you online access to your lab results and also helps you communicate efficiently with your clinic should any questions arise in your care.    Follow up appointment in 3 months with NP scheduling will call you to help make an appointment      Dr Traci Kilgore RN  Phone:  [phone]  Fax:  [phone]

## 2022-09-21 NOTE — PROGRESS NOTES
Gynecologic Oncology Clinic - Established Patient Visit    Visit date: Sep 21, 2022     Reason for visit: history of endometrial cancer    Interval history: Justine Viera is a 57 year old with a history of Stage 1B Grade 1 endometrial cancer treated with surgery and vaginal brachytherapy who presents for scheduled surveillance visit.    She reports she has been overall feeling well. She denies vaginal bleeding or new discharge. She is using dilator about once a week. She has no specific questions or concerns.     Does have a strong family history of cancer due to  undergoing treatment for prostate cancer ( and children have Oconnor Syndrome).    Oncology history:   12/1/21:Endometrial biopsy: FIGO grade 1 endometrioid carcinoma. MMR proteins intact    12/20/21: Robotic TLH, BSO, sentinel nodes. Dr. Bernadette Webber.  A) SENTINEL LYMPH NODE, RIGHT PELVIC, BIOPSY:   1. Four lymph nodes, negative for malignancy (0/4)   2. Cytokeratin AE1/AE3 immunostains are negative for tumor cells     B) SENTINEL LYMPH NODE, LEFT PELVIC 1, BIOPSY:   1. Two lymph nodes, negative for malignancy (0/2)   2. Cytokeratin AE1/AE3 immunostains are negative for tumor cells     C) SENTINEL LYMPH NODE, LEFT PELVIC 2, BIOPSY:   1. One lymph node, negative for malignancy (0/1)   2. Cytokeratin AE1/AE3 immunostain is negative for tumor cells     D) UTERUS WITH CERVIX, OVARIES, AND FALLOPIAN TUBES, TOTAL HYSTERECTOMY WITH BILATERAL SALPINGO-OOPHORECTOMY:   1. Endometrioid carcinoma of the endometrium:      a. Histologic grade: FIGO Grade 1      b. Maximum tumor size: 2.3 cm      c. Myometrial invasion: Present (0.82 cm / 1.6 cm [51%])      d. Lower uterine segment involvement: Absent      e. Cervical involvement: Absent      f. Uterine serosal involvement: Absent      g. Angiolymphatic invasion: Present, focal      h. Margins: Negative      i. DNA mismatch repair enzyme status by immunohistochemistry:         Previously performed, all  intact (see [id number] for details)   2. Right and left ovaries and fallopian tubes are negative for carcinoma   3. Additional findings:      a. Cervix with no significant histologic abnormality      b. Focal superficial adenomyosis      c. Uterine weight: 58 grams      d. 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 Left ovary with benign inclusion cysts and focal adhesions      f. Fallopian tubes with benign paratubal cyst(s)   4. See synoptic section below for complete staging details    Stage IB FIGO grade 1 endometrioid carcinoma    Forming a 2.3 cm mass in the endometrium. Myometrial invasion present (depth of myometrial invasion8.2 mm; myometrial thickness 16 mm; 51% myometrial invasion). Uterine serosa involvement not identified. Cervical stromal involvement not identified. Lymphovascular invasion present. Bilateral ovaries and fallopian tubes are negative for carcinoma. Right ovary with mature cystic teratoma.    1/11/22  Dr. Bernadette Webber Recommended vaginal brachytherapy and observation per NCCN guidelines.  2/11/22: 2nd opinion with [place] [facility] confirms recommended treatment    Review of Systems:  As per HPI, otherwise non-contributory.     Past Medical History:  Past Medical History:   Diagnosis Date     SVT (supraventricular tachycardia) (H)        Past Surgical History:  Past Surgical History:   Procedure Laterality Date     LAPAROSCOPY      cystectomy     SKIN CANCER EXCISION          Physical Exam:  /72   Pulse 72   Temp 97.7  F (36.5  C) (Oral)   Wt 57.2 kg (126 lb)   SpO2 96%   BMI 22.32 kg/m     General appearance: no acute distress, well groomed, sitting comfortably   GI: abdomen soft, non-tender, non-distended, no appreciable masses  :  External: vulva/labia normal in appearance without lesions  Vagina: mucosa is pink, no lesions appreciated, vaginal cuff intact  Cervix: surgically absent  Uterus/adnexa: surgically absent uterus, no pelvic masses appreciated      Labs/Pathology:  See  above.    Imaging review:  n/a    Assessment:  Justine is a 57 year old with a history of high-intermediate risk endometrial cancer treated with surgery and vaginal brachytherapy completed 2/2022 with no concerns for recurrence on exam today.     Plan:  - We reviewed various surveillance follow-up plans. Given her strong family history of cancer, she prefers more intensive surveillance schedule, which is reasonable. We can do a hybrid of the low/high risk group schedules from the ESMO guidelines, so we will plan to do every 3 mos surveillance visits for the first 2-3 years (12/[id number]) and then increase to 6 mos until 5 years from diagnosis (12/2026). We will plan to address follow-up plan at 2 years and we can make a shared decision about whether to increase surveillance interval to 6 mos or to stay at 3 months until 3 years from diagnosis.  - We reviewed signs/symptoms that should prompt evaluation between visits.   - Can discontinue dilator use.    I spent a total of 20 minutes on the care of Justine Viera on the day of service including 15 minutes face to face time and the remainder in chart review, care coordination, and documentation on the day of service.      Gregory Dai MD     Gynecologic Oncology

## 2023-01-25 ENCOUNTER — ONCOLOGY VISIT (OUTPATIENT)
Dept: ONCOLOGY | Facility: CLINIC | Age: 58
End: 2023-01-25
Attending: OBSTETRICS & GYNECOLOGY
Payer: COMMERCIAL

## 2023-01-25 VITALS
HEART RATE: 71 BPM | BODY MASS INDEX: 22.8 KG/M2 | OXYGEN SATURATION: 98 % | DIASTOLIC BLOOD PRESSURE: 75 MMHG | SYSTOLIC BLOOD PRESSURE: 113 MMHG | TEMPERATURE: 97.9 F | RESPIRATION RATE: 16 BRPM | WEIGHT: 128.7 LBS

## 2023-01-25 DIAGNOSIS — C54.1 PRIMARY ENDOMETRIOID CARCINOMA OF ENDOMETRIUM OF UTERINE BODY (H): Primary | ICD-10-CM

## 2023-01-25 PROCEDURE — G0463 HOSPITAL OUTPT CLINIC VISIT: HCPCS

## 2023-01-25 PROCEDURE — 99213 OFFICE O/P EST LOW 20 MIN: CPT | Performed by: OBSTETRICS & GYNECOLOGY

## 2023-01-25 PROCEDURE — G0463 HOSPITAL OUTPT CLINIC VISIT: HCPCS | Performed by: OBSTETRICS & GYNECOLOGY

## 2023-01-25 RX ORDER — CEPHALEXIN 500 MG/1
CAPSULE ORAL
COMMUNITY
Start: 2022-12-24 | End: 2023-04-28

## 2023-01-25 ASSESSMENT — PAIN SCALES - GENERAL: PAINLEVEL: NO PAIN (0)

## 2023-01-25 NOTE — NURSING NOTE
"Oncology Rooming Note    January 25, 2023 9:47 AM   Justine Viera is a 57 year old female who presents for:    Chief Complaint   Patient presents with     Oncology Clinic Visit     Primary endometrioid carcinoma of endometrium of uterine body     Initial Vitals: /75   Pulse 71   Temp 97.9  F (36.6  C) (Oral)   Resp 16   Wt 58.4 kg (128 lb 11.2 oz)   SpO2 98%   BMI 22.80 kg/m   Estimated body mass index is 22.8 kg/m  as calculated from the following:    Height as of 5/9/22: 1.6 m (5' 3\").    Weight as of this encounter: 58.4 kg (128 lb 11.2 oz). Body surface area is 1.61 meters squared.  No Pain (0) Comment: Data Unavailable   No LMP recorded. Patient has had a hysterectomy.  Allergies reviewed: Yes  Medications reviewed: Yes    Medications: Medication refills not needed today.  Pharmacy name entered into [employer] ([employer]): [id number] - [place], [place] - [address] AT [facility] [address]    Clinical concerns: No new clinical concerns other than reason for visit today.           Janae Flores, EMT            "

## 2023-01-25 NOTE — PROGRESS NOTES
Gynecologic Oncology Clinic - Established Patient Visit    Visit date: Jan 25, 2023     Reason for visit: endometrial cancer surveillance    Interval history: Justine Viera is a 57 year old with a history of Stage 1B Grade 1 endometrial cancer treated with surgery and vaginal brachytherapy who presents for scheduled surveillance visit.    She reports she has been overall feeling well.  She is in a splint for a broken hand but otherwise no changes to her medical history.  No abdominal or pelvic pain.  She is undergoing work-up for a chronic cough. She denies vaginal bleeding or new discharge.       Oncology history:   12/1/21:Endometrial biopsy: FIGO grade 1 endometrioid carcinoma. MMR proteins intact    12/20/21: Robotic TLH, BSO, sentinel nodes. Dr. Bernadette Webber. Stage IB FIGO grade 1 endometrioid carcinoma, 2.3cm mass, DOI 8.2/16mm (51%). LVSI+, Right ovary with mature cystic teratoma.  1/11/22  Dr. Bernadette Webber Recommended vaginal brachytherapy  2/11/22: 2nd opinion with [facility] recommended treatment for vaginal brachytherapy.      Past Medical History:  Past Medical History:   Diagnosis Date     SVT (supraventricular tachycardia) (H)        Past Surgical History:  Past Surgical History:   Procedure Laterality Date     LAPAROSCOPY      cystectomy     SKIN CANCER EXCISION          Physical Exam:  /75   Pulse 71   Temp 97.9  F (36.6  C) (Oral)   Resp 16   Wt 58.4 kg (128 lb 11.2 oz)   SpO2 98%   BMI 22.80 kg/m     General appearance: no acute distress, well groomed, sitting comfortably   GI: abdomen soft, non-tender, non-distended, no appreciable masses  :  External: vulva/labia normal in appearance without lesions  Vagina: mucosa is pink, no lesions appreciated, vaginal cuff intact, changes c/w radiation  Cervix: surgically absent  Uterus/adnexa: surgically absent uterus, no pelvic masses appreciated      Labs/Pathology:  See above.    Imaging review:  n/a    Assessment:  Justine is a 57 year old  with a history of high-intermediate risk endometrial cancer treated with surgery and vaginal brachytherapy completed 2/2022 with no concerns for recurrence on exam today.     Plan:  - Given her strong family history of cancer, she prefers more intensive surveillance schedule, which is reasonable. We have planned to do a hybrid of the low/high risk group schedules from the ESMO guidelines, so we will plan to do every 3 mos surveillance visits for the first 2-3 years ([id number]/[id number]) and then increase to 6 mos until 5 years from diagnosis (12/2026).     -We will plan to address follow-up plan at 2 years and we can make a shared decision about whether to increase surveillance interval to 6 mos or to stay at 3 months until 3 years from diagnosis.    - We reviewed signs/symptoms that should prompt evaluation between visits.     Return to clinic in 3 months for exam.   I spent a total of 20 minutes on the care of Justine Viera on day of service.    Gregory Dai MD     Gynecologic Oncology

## 2023-01-25 NOTE — LETTER
1/25/2023         RE: Justine Viera  [address] [facility] [place] [address]        Dear Colleague,    Thank you for referring your patient, Justine Viera, to the [facility]. Please see a copy of my visit note below.    Gynecologic Oncology Clinic - Established Patient Visit    Visit date: Jan 25, 2023     Reason for visit: endometrial cancer surveillance    Interval history: Justine Viera is a 57 year old with a history of Stage 1B Grade 1 endometrial cancer treated with surgery and vaginal brachytherapy who presents for scheduled surveillance visit.    She reports she has been overall feeling well.  She is in a splint for a broken hand but otherwise no changes to her medical history.  No abdominal or pelvic pain.  She is undergoing work-up for a chronic cough. She denies vaginal bleeding or new discharge.       Oncology history:   12/1/21:Endometrial biopsy: FIGO grade 1 endometrioid carcinoma. MMR proteins intact    12/20/21: Robotic TLH, BSO, sentinel nodes. Dr. Bernadette Webber. Stage IB FIGO grade 1 endometrioid carcinoma, 2.3cm mass, DOI 8.2/16mm (51%). LVSI+, Right ovary with mature cystic teratoma.  1/11/22  Dr. Bernadette Webber Recommended vaginal brachytherapy  2/11/22: 2nd opinion with [facility] recommended treatment for vaginal brachytherapy.      Past Medical History:  Past Medical History:   Diagnosis Date     SVT (supraventricular tachycardia) (H)        Past Surgical History:  Past Surgical History:   Procedure Laterality Date     LAPAROSCOPY      cystectomy     SKIN CANCER EXCISION          Physical Exam:  /75   Pulse 71   Temp 97.9  F (36.6  C) (Oral)   Resp 16   Wt 58.4 kg (128 lb 11.2 oz)   SpO2 98%   BMI 22.80 kg/m     General appearance: no acute distress, well groomed, sitting comfortably   GI: abdomen soft, non-tender, non-distended, no appreciable masses  :  External: vulva/labia normal in appearance without lesions  Vagina: mucosa is pink, no lesions  appreciated, vaginal cuff intact, changes c/w radiation  Cervix: surgically absent  Uterus/adnexa: surgically absent uterus, no pelvic masses appreciated      Labs/Pathology:  See above.    Imaging review:  n/a    Assessment:  Justine is a 57 year old with a history of high-intermediate risk endometrial cancer treated with surgery and vaginal brachytherapy completed 2/2022 with no concerns for recurrence on exam today.     Plan:  - Given her strong family history of cancer, she prefers more intensive surveillance schedule, which is reasonable. We have planned to do a hybrid of the low/high risk group schedules from the ESMO guidelines, so we will plan to do every 3 mos surveillance visits for the first 2-3 years (12/[id number]) and then increase to 6 mos until 5 years from diagnosis (12/2026).     -We will plan to address follow-up plan at 2 years and we can make a shared decision about whether to increase surveillance interval to 6 mos or to stay at 3 months until 3 years from diagnosis.    - We reviewed signs/symptoms that should prompt evaluation between visits.     Return to clinic in 3 months for exam.   I spent a total of 20 minutes on the care of Justine Viera on day of service.    Gregory Dai MD     Gynecologic Oncology

## 2023-01-27 NOTE — PATIENT INSTRUCTIONS
It was a pleasure seeing you in clinic today-please reach out if there are any further questions that arise following today's visit.  During business hours, you may reach me at [phone].  For urgent/emergent questions after business hours, you may reach the on-call Gynecologic Oncology Resident through the [facility]  at [phone].    All normal test results are usually communicated via letter or [employer] message.  Abnormal results (those that require a change in the previously discussed plan of care) are usually communicated via a phone call.    I recommend signing up for [employer] access if you have not already done so.  This allows you online access to your lab results and also helps you communicate efficiently with your clinic should any questions arise in your care.    Follow up appointment in 3 months with MD scheduling will call you to help make an appointment      Dr Traci Kilgore RN  Phone:  [phone]  Fax:  [phone]

## 2023-04-19 ENCOUNTER — ONCOLOGY VISIT (OUTPATIENT)
Dept: ONCOLOGY | Facility: CLINIC | Age: 58
End: 2023-04-19
Attending: OBSTETRICS & GYNECOLOGY
Payer: COMMERCIAL

## 2023-04-19 VITALS
SYSTOLIC BLOOD PRESSURE: 131 MMHG | BODY MASS INDEX: 22.9 KG/M2 | HEART RATE: 73 BPM | WEIGHT: 129.3 LBS | RESPIRATION RATE: 16 BRPM | OXYGEN SATURATION: 100 % | DIASTOLIC BLOOD PRESSURE: 76 MMHG | TEMPERATURE: 98.2 F

## 2023-04-19 DIAGNOSIS — C54.1 PRIMARY ENDOMETRIOID CARCINOMA OF ENDOMETRIUM OF UTERINE BODY (H): Primary | ICD-10-CM

## 2023-04-19 PROCEDURE — 99213 OFFICE O/P EST LOW 20 MIN: CPT | Performed by: OBSTETRICS & GYNECOLOGY

## 2023-04-19 PROCEDURE — G0463 HOSPITAL OUTPT CLINIC VISIT: HCPCS | Performed by: OBSTETRICS & GYNECOLOGY

## 2023-04-19 ASSESSMENT — PAIN SCALES - GENERAL: PAINLEVEL: NO PAIN (0)

## 2023-04-19 NOTE — NURSING NOTE
"Oncology Rooming Note    April 19, 2023 3:23 PM   Justine Viera is a 57 year old female who presents for:    Chief Complaint   Patient presents with     Oncology Clinic Visit     Return- uterine endometrial cancer     Initial Vitals: /76 (BP Location: Right arm, Patient Position: Sitting, Cuff Size: Adult Regular)   Pulse 73   Temp 98.2  F (36.8  C) (Oral)   Resp 16   Wt 58.7 kg (129 lb 4.8 oz)   SpO2 100%   BMI 22.90 kg/m   Estimated body mass index is 22.9 kg/m  as calculated from the following:    Height as of 5/9/22: 1.6 m (5' 3\").    Weight as of this encounter: 58.7 kg (129 lb 4.8 oz). Body surface area is 1.62 meters squared.  No Pain (0) Comment: Data Unavailable   No LMP recorded. Patient has had a hysterectomy.  Allergies reviewed: Yes  Medications reviewed: Yes    Medications: Medication refills not needed today.  Pharmacy name entered into [employer]: [id number] - [place], [place] - [address] AT [facility] [address]    Clinical concerns: No new clinical concerns other than reason for visit today.      Mary De Los Santos            "

## 2023-04-19 NOTE — LETTER
4/19/2023         RE: Justine Viera  [address] [address]        Dear Colleague,    Thank you for referring your patient, Justine Viera, to the [facility] CANCER [facility]. Please see a copy of my visit note below.    Gynecologic Oncology Clinic - Established Patient Visit    Visit date: Apr 19, 2023     Reason for visit: endometrial cancer surveillance    Interval history: Justine Viera is a 57 year old with a history of Stage 1B Grade 1 endometrial cancer treated with surgery and vaginal brachytherapy who presents for scheduled surveillance visit.    She reports she has been overall feeling well. No abdominal or pelvic pain. No vaginal bleeding. No change in bladder or bowel habits.     Oncology history:   12/1/21:Endometrial biopsy: FIGO grade 1 endometrioid carcinoma. MMR proteins intact  12/20/21: Robotic TLH, BSO, sentinel nodes. Dr. Bernadette Webber. Stage IB FIGO grade 1 endometrioid carcinoma, 2.3cm mass, DOI 8.2/16mm (51%). LVSI+, Right ovary with mature cystic teratoma.  1/11/22  Dr. Bernadette Webber Recommended vaginal brachytherapy  2/11/22: 2nd opinion with [facility] recommended treatment for vaginal brachytherapy.      Past Medical History:  Past Medical History:   Diagnosis Date    Endometrial cancer (H)     SVT (supraventricular tachycardia) (H)        Past Surgical History:  Past Surgical History:   Procedure Laterality Date    AS INSERT VAGINAL RADIATION DEVICE      Vaginal brachytherapy    LAPAROSCOPIC HYSTERECTOMY TOTAL, BILATERAL SALPINGO-OOPHORECTOMY, NODE DISSECTION, COMBINED  12/20/2021    Robot-assisted laparoscopic hysterectomy, bilateral salpingo-oophorectomy, sentinel lymph nodes    SKIN CANCER EXCISION          Physical Exam:  /76 (BP Location: Right arm, Patient Position: Sitting, Cuff Size: Adult Regular)   Pulse 73   Temp 98.2  F (36.8  C) (Oral)   Resp 16   Wt 58.7 kg (129 lb 4.8 oz)   SpO2 100%   BMI 22.90 kg/m     General appearance: no  acute distress, well groomed, sitting comfortably   GI: abdomen soft, non-tender, non-distended, no appreciable masses  :  External: vulva/labia normal in appearance without lesions  Vagina: mucosa is pink, no lesions appreciated, vaginal cuff intact, changes c/w radiation  Cervix: surgically absent  Uterus/adnexa: surgically absent uterus, no pelvic masses appreciated      Labs/Pathology:  No new    Imaging review:  n/a    Assessment:  Justine is a 57 year old with a history of high-intermediate risk endometrial cancer treated with surgery and vaginal brachytherapy completed 2/2022 with no concerns for recurrence on exam today.     Plan:  - Discussed follow-up and she would like to move to every 6 month follow-up schedule. We will plan to do every 6 months for the first 3 years which is until (12/2024) and then increase to 1 year until 5 years from diagnosis (12/2026).     - We reviewed signs/symptoms that should prompt evaluation between visits.     Return to clinic in 6 months for exam.     Gregory Dai MD     Gynecologic Oncology

## 2023-04-23 ENCOUNTER — HEALTH MAINTENANCE LETTER (OUTPATIENT)
Age: 58
End: 2023-04-23

## 2023-04-26 NOTE — PATIENT INSTRUCTIONS
It was a pleasure seeing you in clinic today-please reach out if there are any further questions that arise following today's visit.  During business hours, you may reach me at [phone].  For urgent/emergent questions after business hours, you may reach the on-call Gynecologic Oncology Resident through the [facility]  at [phone].    All normal test results are usually communicated via letter or [employer] message.  Abnormal results (those that require a change in the previously discussed plan of care) are usually communicated via a phone call.    I recommend signing up for [employer] access if you have not already done so.  This allows you online access to your lab results and also helps you communicate efficiently with your clinic should any questions arise in your care.    Follow up appointment in 6 months with MD scheduling will call you to help make an appointment      Dr Traci Kilgore RN  Phone:  [phone]  Fax:  [phone]

## 2023-04-28 NOTE — PROGRESS NOTES
Gynecologic Oncology Clinic - Established Patient Visit    Visit date: Apr 19, 2023     Reason for visit: endometrial cancer surveillance    Interval history: Justine Viera is a 57 year old with a history of Stage 1B Grade 1 endometrial cancer treated with surgery and vaginal brachytherapy who presents for scheduled surveillance visit.    She reports she has been overall feeling well. No abdominal or pelvic pain. No vaginal bleeding. No change in bladder or bowel habits.     Oncology history:   12/1/21:Endometrial biopsy: FIGO grade 1 endometrioid carcinoma. MMR proteins intact  12/20/21: Robotic TLH, BSO, sentinel nodes. Dr. Bernadette Webber. Stage IB FIGO grade 1 endometrioid carcinoma, 2.3cm mass, DOI 8.2/16mm (51%). LVSI+, Right ovary with mature cystic teratoma.  1/11/22  Dr. Bernadette Webber Recommended vaginal brachytherapy  2/11/22: 2nd opinion with [facility] recommended treatment for vaginal brachytherapy.      Past Medical History:  Past Medical History:   Diagnosis Date     Endometrial cancer (H)      SVT (supraventricular tachycardia) (H)        Past Surgical History:  Past Surgical History:   Procedure Laterality Date     AS INSERT VAGINAL RADIATION DEVICE      Vaginal brachytherapy     LAPAROSCOPIC HYSTERECTOMY TOTAL, BILATERAL SALPINGO-OOPHORECTOMY, NODE DISSECTION, COMBINED  12/20/2021    Robot-assisted laparoscopic hysterectomy, bilateral salpingo-oophorectomy, sentinel lymph nodes     SKIN CANCER EXCISION          Physical Exam:  /76 (BP Location: Right arm, Patient Position: Sitting, Cuff Size: Adult Regular)   Pulse 73   Temp 98.2  F (36.8  C) (Oral)   Resp 16   Wt 58.7 kg (129 lb 4.8 oz)   SpO2 100%   BMI 22.90 kg/m     General appearance: no acute distress, well groomed, sitting comfortably   GI: abdomen soft, non-tender, non-distended, no appreciable masses  :  External: vulva/labia normal in appearance without lesions  Vagina: mucosa is pink, no lesions appreciated, vaginal cuff  intact, changes c/w radiation  Cervix: surgically absent  Uterus/adnexa: surgically absent uterus, no pelvic masses appreciated      Labs/Pathology:  No new    Imaging review:  n/a    Assessment:  Justine is a 57 year old with a history of high-intermediate risk endometrial cancer treated with surgery and vaginal brachytherapy completed 2/2022 with no concerns for recurrence on exam today.     Plan:  - Discussed follow-up and she would like to move to every 6 month follow-up schedule. We will plan to do every 6 months for the first 3 years which is until (12/2024) and then increase to 1 year until 5 years from diagnosis (12/2026).     - We reviewed signs/symptoms that should prompt evaluation between visits.     Return to clinic in 6 months for exam.     Gregory Dai MD     Gynecologic Oncology

## 2023-06-16 ENCOUNTER — LAB REQUISITION (OUTPATIENT)
Dept: LAB | Facility: CLINIC | Age: 58
End: 2023-06-16

## 2023-06-16 DIAGNOSIS — R30.0 DYSURIA: ICD-10-CM

## 2023-06-16 PROCEDURE — 87086 URINE CULTURE/COLONY COUNT: CPT | Performed by: FAMILY MEDICINE

## 2023-06-18 LAB — BACTERIA UR CULT: NORMAL

## 2023-09-27 ENCOUNTER — LAB REQUISITION (OUTPATIENT)
Dept: LAB | Facility: CLINIC | Age: 58
End: 2023-09-27

## 2023-09-27 ENCOUNTER — TRANSFERRED RECORDS (OUTPATIENT)
Dept: HEALTH INFORMATION MANAGEMENT | Facility: CLINIC | Age: 58
End: 2023-09-27

## 2023-09-27 DIAGNOSIS — Z13.220 ENCOUNTER FOR SCREENING FOR LIPOID DISORDERS: ICD-10-CM

## 2023-09-27 DIAGNOSIS — Z86.69 PERSONAL HISTORY OF OTHER DISEASES OF THE NERVOUS SYSTEM AND SENSE ORGANS: ICD-10-CM

## 2023-09-27 PROCEDURE — 80048 BASIC METABOLIC PNL TOTAL CA: CPT | Performed by: FAMILY MEDICINE

## 2023-09-27 PROCEDURE — 84478 ASSAY OF TRIGLYCERIDES: CPT | Performed by: FAMILY MEDICINE

## 2023-09-28 LAB
ANION GAP SERPL CALCULATED.3IONS-SCNC: 14 MMOL/L (ref 7–15)
BUN SERPL-MCNC: 13.1 MG/DL (ref 6–20)
CALCIUM SERPL-MCNC: 9.9 MG/DL (ref 8.6–10)
CHLORIDE SERPL-SCNC: 105 MMOL/L (ref 98–107)
CHOLEST SERPL-MCNC: 198 MG/DL
CREAT SERPL-MCNC: 0.65 MG/DL (ref 0.51–0.95)
EGFRCR SERPLBLD CKD-EPI 2021: >90 ML/MIN/1.73M2
GLUCOSE SERPL-MCNC: 93 MG/DL (ref 70–99)
HCO3 SERPL-SCNC: 26 MMOL/L (ref 22–29)
HDLC SERPL-MCNC: 76 MG/DL
LDLC SERPL CALC-MCNC: 107 MG/DL
NONHDLC SERPL-MCNC: 122 MG/DL
POTASSIUM SERPL-SCNC: 4.6 MMOL/L (ref 3.4–5.3)
SODIUM SERPL-SCNC: 145 MMOL/L (ref 135–145)
TRIGL SERPL-MCNC: 74 MG/DL

## 2023-10-18 ENCOUNTER — ONCOLOGY VISIT (OUTPATIENT)
Dept: ONCOLOGY | Facility: CLINIC | Age: 58
End: 2023-10-18
Attending: OBSTETRICS & GYNECOLOGY
Payer: COMMERCIAL

## 2023-10-18 VITALS
HEART RATE: 70 BPM | WEIGHT: 127.2 LBS | HEIGHT: 63 IN | OXYGEN SATURATION: 99 % | RESPIRATION RATE: 16 BRPM | BODY MASS INDEX: 22.54 KG/M2 | SYSTOLIC BLOOD PRESSURE: 128 MMHG | TEMPERATURE: 98.2 F | DIASTOLIC BLOOD PRESSURE: 80 MMHG

## 2023-10-18 DIAGNOSIS — C54.1 PRIMARY ENDOMETRIOID CARCINOMA OF ENDOMETRIUM OF UTERINE BODY (H): Primary | ICD-10-CM

## 2023-10-18 PROCEDURE — 99213 OFFICE O/P EST LOW 20 MIN: CPT | Performed by: OBSTETRICS & GYNECOLOGY

## 2023-10-18 ASSESSMENT — PAIN SCALES - GENERAL: PAINLEVEL: NO PAIN (0)

## 2023-10-18 NOTE — NURSING NOTE
"Oncology Rooming Note    October 18, 2023 8:58 AM   Justine Viera is a 58 year old female who presents for:    Chief Complaint   Patient presents with    Oncology Clinic Visit     P RETURN - PRIMARY ENDOMETRIOID CARCINOMA OF ENDOMETRIUM      Initial Vitals: /80 (BP Location: Right arm, Patient Position: Chair, Cuff Size: Adult Regular)   Pulse 70   Temp 98.2  F (36.8  C) (Oral)   Resp 16   Ht 1.6 m (5' 2.99\")   Wt 57.7 kg (127 lb 3.2 oz)   SpO2 99%   BMI 22.54 kg/m   Estimated body mass index is 22.54 kg/m  as calculated from the following:    Height as of this encounter: 1.6 m (5' 2.99\").    Weight as of this encounter: 57.7 kg (127 lb 3.2 oz). Body surface area is 1.6 meters squared.  No Pain (0) Comment: Data Unavailable   No LMP recorded. Patient has had a hysterectomy.  Allergies reviewed: Yes  Medications reviewed: Yes    Medications: Medication refills not needed today.  Pharmacy name entered into [employer]: [id number] #[id number] - [place], [place] - [address] AT [facility] [address]              "

## 2023-10-18 NOTE — PROGRESS NOTES
"Gynecologic Oncology Clinic - Established Patient Visit    Visit date: Oct 18, 2023     Reason for visit: endometrial cancer surveillance    Interval history: Justine Viera is a 58 year old with a history of Stage 1B Grade 1 endometrial cancer treated with surgery and vaginal brachytherapy who presents for scheduled surveillance visit.    She reports she has been overall feeling well. Infrequent abdominal pain. Is going through work-up for globus sensation when following. No vaginal bleeding. No pelvic symptoms.    Oncology history:   12/1/21:Endometrial biopsy: FIGO grade 1 endometrioid carcinoma. MMR proteins intact  12/20/21: Robotic TLH, BSO, sentinel nodes. Dr. Bernadette Webber. Stage IB FIGO grade 1 endometrioid carcinoma, 2.3cm mass, DOI 8.2/16mm (51%). LVSI+, Right ovary with mature cystic teratoma.  1/11/22  Dr. Bernadette Webber Recommended vaginal brachytherapy  2/11/22: 2nd opinion with [facility] recommended treatment for vaginal brachytherapy.  2/17/22-2/25/22 Vaginal brachytherapy in 3fx    Physical Exam:  /80 (BP Location: Right arm, Patient Position: Chair, Cuff Size: Adult Regular)   Pulse 70   Temp 98.2  F (36.8  C) (Oral)   Resp 16   Ht 1.6 m (5' 2.99\")   Wt 57.7 kg (127 lb 3.2 oz)   SpO2 99%   BMI 22.54 kg/m     General appearance: no acute distress, well groomed, sitting comfortably   GI: abdomen soft, non-tender, non-distended, no appreciable masses  :  External: vulva/labia normal in appearance without lesions  Vagina: mucosa is pink, no lesions appreciated, vaginal cuff intact, changes c/w radiation  Cervix: surgically absent  Uterus/adnexa: surgically absent uterus, no pelvic masses appreciated, some discomfort    Labs/Pathology:  No new    Imaging review:  n/a    Assessment:  Justine is a 58 year old with a history of high-intermediate risk endometrial cancer treated with surgery and vaginal brachytherapy completed 2/2022 with no concerns for recurrence on exam or history today. "     Plan:  - We will plan to do every 6 months for the first 3 years which is until (12/2024) and then increase to 1 year until 5 years from diagnosis (12/2026).     - We reviewed signs/symptoms that should prompt evaluation between visits.     Return to clinic in 6 months for exam.     Gregory Dai MD     Gynecologic Oncology     I spent a total of 20 minutes on the care of Justine FELISHA Viera on the day of service including face to face time, care coordination, and documentation on the day of service.

## 2023-10-18 NOTE — LETTER
"    10/18/2023         RE: Justine Viera  [address] [facility] [address]        Dear Colleague,    Thank you for referring your patient, Justine Viera, to the [facility]. Please see a copy of my visit note below.    Gynecologic Oncology Clinic - Established Patient Visit    Visit date: Oct 18, 2023     Reason for visit: endometrial cancer surveillance    Interval history: Justine Viera is a 58 year old with a history of Stage 1B Grade 1 endometrial cancer treated with surgery and vaginal brachytherapy who presents for scheduled surveillance visit.    She reports she has been overall feeling well. Infrequent abdominal pain. Is going through work-up for globus sensation when following. No vaginal bleeding. No pelvic symptoms.    Oncology history:   12/1/21:Endometrial biopsy: FIGO grade 1 endometrioid carcinoma. MMR proteins intact  12/20/21: Robotic TLH, BSO, sentinel nodes. Dr. Bernadette Webber. Stage IB FIGO grade 1 endometrioid carcinoma, 2.3cm mass, DOI 8.2/16mm (51%). LVSI+, Right ovary with mature cystic teratoma.  1/11/22  Dr. Bernadette Webber Recommended vaginal brachytherapy  2/11/22: 2nd opinion with [facility] recommended treatment for vaginal brachytherapy.  2/17/22-2/25/22 Vaginal brachytherapy in 3fx    Physical Exam:  /80 (BP Location: Right arm, Patient Position: Chair, Cuff Size: Adult Regular)   Pulse 70   Temp 98.2  F (36.8  C) (Oral)   Resp 16   Ht 1.6 m (5' 2.99\")   Wt 57.7 kg (127 lb 3.2 oz)   SpO2 99%   BMI 22.54 kg/m     General appearance: no acute distress, well groomed, sitting comfortably   GI: abdomen soft, non-tender, non-distended, no appreciable masses  :  External: vulva/labia normal in appearance without lesions  Vagina: mucosa is pink, no lesions appreciated, vaginal cuff intact, changes c/w radiation  Cervix: surgically absent  Uterus/adnexa: surgically absent uterus, no pelvic masses appreciated, some discomfort    Labs/Pathology:  No " new    Imaging review:  n/a    Assessment:  Justine is a 58 year old with a history of high-intermediate risk endometrial cancer treated with surgery and vaginal brachytherapy completed 2/2022 with no concerns for recurrence on exam or history today.     Plan:  - We will plan to do every 6 months for the first 3 years which is until (12/2024) and then increase to 1 year until 5 years from diagnosis (12/2026).     - We reviewed signs/symptoms that should prompt evaluation between visits.     Return to clinic in 6 months for exam.     Gregory Dai MD     Gynecologic Oncology     I spent a total of 20 minutes on the care of Justine Viera on the day of service including face to face time, care coordination, and documentation on the day of service.

## 2023-10-18 NOTE — PATIENT INSTRUCTIONS
It was a pleasure seeing you in clinic today-please reach out if there are any further questions that arise following today's visit.  During business hours, you may reach me at [phone].  For urgent/emergent questions after business hours, you may reach the on-call Gynecologic Oncology Resident through the [facility]  at [phone].    All normal test results are usually communicated via letter or [email or web address] message.  Abnormal results (those that require a change in the previously discussed plan of care) are usually communicated via a phone call.    I recommend signing up for [employer] access if you have not already done so.  This allows you online access to your lab results and also helps you communicate efficiently with your clinic should any questions arise in your care.    Follow up appointment in 6 months with MD scheduling will call you to help make an appointment      Dr Traci Kilgore RN  Phone:  [phone]  Fax:  [phone]

## 2023-11-27 ENCOUNTER — LAB REQUISITION (OUTPATIENT)
Dept: LAB | Facility: CLINIC | Age: 58
End: 2023-11-27

## 2023-11-27 ENCOUNTER — NURSE TRIAGE (OUTPATIENT)
Dept: CARDIOLOGY | Facility: CLINIC | Age: 58
End: 2023-11-27
Payer: COMMERCIAL

## 2023-11-27 ENCOUNTER — TRANSFERRED RECORDS (OUTPATIENT)
Dept: HEALTH INFORMATION MANAGEMENT | Facility: CLINIC | Age: 58
End: 2023-11-27
Payer: COMMERCIAL

## 2023-11-27 DIAGNOSIS — R00.1 BRADYCARDIA, UNSPECIFIED: ICD-10-CM

## 2023-11-27 PROCEDURE — 84443 ASSAY THYROID STIM HORMONE: CPT | Performed by: FAMILY MEDICINE

## 2023-11-27 NOTE — TELEPHONE ENCOUNTER
"Received a call from the call center to discuss a slow heart rate.  Patient has not been seen by cardiology since 2020.  Spoke to Justine, who says she woke up in the middle of the night and felt like she couldn't breathe, but was able to go back to sleep. When she got up today, her heart rate was bouncing around from 37 bpm up to the 60's and 70's per pulse oximeter.   She says she had a GI doctor appointment last week and her heart rate was doing the same thing during that visit, and they checked it four times.  She says she has a family history of a left bundle branch block and slow heart rate.  She says her heart rate is currently 74 bpm. She denies having lightheadedness or shortness of breath today.  Advised it is recommended to go to Urgent Care for an evaluation. She verbalized agreement and understanding and will go to the [facility] in [place] where her  has been seen before.    1. DESCRIPTION: \"Please describe your heart rate or heartbeat that you are having\" (e.g., fast/slow, regular/irregular, skipped or extra beats, \"palpitations\") slow  2. ONSET: \"When did it start?\" (Minutes, hours or days) found last week  3. DURATION: \"How long does it last\" (e.g., seconds, minutes, hours)  4. PATTERN \"Does it come and go, or has it been constant since it started?\" \"Does it get worse with exertion?\" \"Are you feeling it now?\" intermittent  5. TAP: \"Using your hand, can you tap out what you are feeling on a chair or table in front of you, so that I can hear?\" (Note: not all patients can do this)  6. HEART RATE: \"Can you tell me your heart rate?\" \"How many beats in 15 seconds?\" (Note: not all patients can do this) currently 74 bpm  7. RECURRENT SYMPTOM: \"Have you ever had this before?\" If Yes, ask: \"When was the last time?\" and \"What happened that time?\"  8. CAUSE: \"What do you think is causing the palpitations?\"  9. CARDIAC HISTORY: \"Do you have any history of heart disease?\" (e.g., heart attack, " "angina, bypass surgery, angioplasty, arrhythmia) MVP, PVC's  10. OTHER SYMPTOMS: \"Do you have any other symptoms?\" (e.g., dizziness, chest pain, sweating, difficulty breathing) one episode of shortness of breath during the night, none since. Denies lightheadedness, fatigue  11. PREGNANCY: \"Is there any chance you are pregnant?\" \"When was your last menstrual period?\"  Reason for Disposition    Patient sounds very sick or weak to the triager    Protocols used: Heart Rate and Heartbeat [id number]    "

## 2023-11-28 ENCOUNTER — OFFICE VISIT (OUTPATIENT)
Dept: CARDIOLOGY | Facility: CLINIC | Age: 58
End: 2023-11-28
Payer: COMMERCIAL

## 2023-11-28 ENCOUNTER — HOSPITAL ENCOUNTER (OUTPATIENT)
Dept: CARDIOLOGY | Facility: CLINIC | Age: 58
Discharge: HOME | End: 2023-11-28
Attending: INTERNAL MEDICINE | Admitting: INTERNAL MEDICINE
Payer: COMMERCIAL

## 2023-11-28 VITALS
BODY MASS INDEX: 22.5 KG/M2 | HEART RATE: 68 BPM | HEIGHT: 63 IN | SYSTOLIC BLOOD PRESSURE: 116 MMHG | DIASTOLIC BLOOD PRESSURE: 70 MMHG | WEIGHT: 127 LBS | RESPIRATION RATE: 14 BRPM

## 2023-11-28 DIAGNOSIS — R00.1 BRADYCARDIA: ICD-10-CM

## 2023-11-28 DIAGNOSIS — I49.3 PVC'S (PREMATURE VENTRICULAR CONTRACTIONS): Primary | ICD-10-CM

## 2023-11-28 DIAGNOSIS — I49.3 PVC'S (PREMATURE VENTRICULAR CONTRACTIONS): ICD-10-CM

## 2023-11-28 LAB
LVEF ECHO: NORMAL
TSH SERPL DL<=0.005 MIU/L-ACNC: 1.18 UIU/ML (ref 0.3–4.2)

## 2023-11-28 PROCEDURE — 93306 TTE W/DOPPLER COMPLETE: CPT

## 2023-11-28 PROCEDURE — 93306 TTE W/DOPPLER COMPLETE: CPT | Mod: 26 | Performed by: INTERNAL MEDICINE

## 2023-11-28 PROCEDURE — 99204 OFFICE O/P NEW MOD 45 MIN: CPT | Mod: 25 | Performed by: INTERNAL MEDICINE

## 2023-11-28 NOTE — PROGRESS NOTES
[facility]  [phone]          Assessment/Recommendations   Patient with known history of SVT, with less symptoms recently and unable to perform ablation as could not be reproduced several years ago.  She also has frequent PVCs and a Holter monitor in 2020 showed 17,000 PVCs and short runs of relatively slow nonsustained ventricular tachycardia.  EP evaluation by Dr. Mack and low-dose beta-blocker for 3 months and then tapered off.  No evidence of reduced left ventricular systolic function at that time.    Recent medical visits for new diagnosis of breast cancer with a heart rate detected at 37.  I suspect this is related to missed PVCs as she was asymptomatic at the time.  She has had some lightheaded episodes which are very brief in nature and feels like she has the start of SVT but it does not get going.  She has had a normal stress echocardiogram in 2020 as well.    She does have a 2-week monitor on at this time and we will see if she is having significant rhythm disturbances.  Will also get an echocardiogram, given the frequency of her PVCs in the past, we would want to know that she has not developed a cardiomyopathy.    She had a normal TSH yesterday and other laboratory work was unremarkable.    Do not suspect this will hamper any surgical procedures for breast cancer, particularly if the echocardiogram shows normal left ventricular systolic function.    She will call us when the monitor is off so that we can find and review the results of the monitor.    Thank you for allowing us to participate in her care.       History of Present Illness/Subjective    Patient with a history of SVT with an attempt at ablation in approximately 2017.  This was not successful as they were unable to initiate the SVT even with isoproterenol infusion.  She also developed premature ventricular contractions and saw Dr. Yusuf in our group also Dr. Mack, one of our electrophysiologist.  This evaluation  is 2020.  Holter monitor at that time showed 17,000 PVCs over 24 hours with 15.8% of all of her heartbeats.  She had short slow runs of nonsustained ventricular tachycardia.  The ventricular tachycardia PVC morphology had a right bundle branch configuration and inferior axis suggesting left ventricular outflow tract ectopy.  Stress echocardiogram at that time showed a resting ejection fraction of 60% with mild mitral valve prolapse and moderately frequent PVCs.  The stress echo portion was normal both by EKG and LV systolic function improvement and no wall motion abnormalities.  Ventricular ectopy improved during exercise and worsened in the recovery phase.    The patient more recently was in the clinic and noted to have a heart rate of 37.  This was done using an automated cuff.  She was not having any symptoms at that time.  She has been under extra stress of late with diagnosis of breast cancer which seems to be caught early with good markers or successful therapy.  She has been evaluated for what she describes as something stuck in her throat and she has seen ears nose and throat person and sometimes can even get some discomfort in her chest which feels like something stuck in her esophagus.  She was asked to do an endoscopy and has not done that yet as it is just not been scheduled.    She denies any syncopal episodes but occasionally will get mild lightheaded feeling.  She had 1 episode where she awoke at night recently and felt a bit short of breath and it lasted about 30 seconds and then went away and she was able to sleep without difficulty.  She does build the head of the bed or up for gastroesophageal reflux.  She has no peripheral edema no history of rheumatic fever or cerebrovascular accident or TIA.  She does have a history of heart murmur.    She is not diabetic, is not hypertensive, but her father had a myocardial infarction at age 47 and her mom had a pacemaker.  She has no hyperlipidemia and has  "never smoked cigarettes.    She grew up in [place] and [place] and [place].  She is  and has worked as a homemaker.  She has 3 children 1 daughter has bicuspid aortic valve.        ECG: Personally reviewed.  Yesterday showed sinus rhythm at 73 bpm.  There are some baseline wander and some nonspecific ST changes.       Physical Examination Review of Systems   /70 (BP Location: Right arm, Patient Position: Sitting, Cuff Size: Adult Regular)   Pulse 68   Resp 14   Ht 1.607 m (5' 3.25\")   Wt 57.6 kg (127 lb)   BMI 22.32 kg/m    Body mass index is 22.32 kg/m .  Wt Readings from Last 3 Encounters:   11/28/23 57.6 kg (127 lb)   10/18/23 57.7 kg (127 lb 3.2 oz)   04/19/23 58.7 kg (129 lb 4.8 oz)     General Appearance:   Alert, cooperative and in no acute distress.   ENT/Mouth: Pink/moist oral mucosa   EYES:  no scleral icterus, normal conjunctivae   Neck: JVP normal. No Hepatojugular reflux. Thyroid not visualized.   Chest/Lungs:   Lungs are clear to auscultation, equal chest wall expansion.   Cardiovascular:   S1, S2 without murmur ,clicks or rubs. Brachial, radial and posterior tibial pulses are intact and symetric. No carotid bruits noted   Abdomen:  Nontender. BS+. No bruits.   Extremities: No cyanosis, clubbing or edema   Skin: no xanthelasma, warm.    Neurologic: normal arm movement bilateral, no tremors     Psychiatric: Appropriate affect.      Enc Vitals  BP: 116/70  Pulse: 68  Resp: 14  Weight: 57.6 kg (127 lb)  Height: 160.7 cm (5' 3.25\")                                           Medical History  Surgical History Family History Social History   Past Medical History:   Diagnosis Date    Endometrial cancer (H)     SVT (supraventricular tachycardia)     Past Surgical History:   Procedure Laterality Date    AS INSERT VAGINAL RADIATION DEVICE      Vaginal brachytherapy    LAPAROSCOPIC HYSTERECTOMY TOTAL, BILATERAL SALPINGO-OOPHORECTOMY, NODE DISSECTION, COMBINED  12/20/2021    Robot-assisted " laparoscopic hysterectomy, bilateral salpingo-oophorectomy, sentinel lymph nodes    SKIN CANCER EXCISION      Family History   Problem Relation Age of Onset    Acute Myocardial Infarction Father 47    Prostate Cancer Father     Prostate Cancer Paternal Uncle     Colon Cancer No family hx of         no overy, uterus, breast    Social History     Socioeconomic History    Marital status:      Spouse name: Not on file    Number of children: Not on file    Years of education: Not on file    Highest education level: Not on file   Occupational History    Not on file   Tobacco Use    Smoking status: Never    Smokeless tobacco: Never   Vaping Use    Vaping Use: Never used   Substance and Sexual Activity    Alcohol use: Never    Drug use: Never    Sexual activity: Not on file   Other Topics Concern    Not on file   Social History Narrative    Not on file     Social Determinants of Health     Financial Resource Strain: Not on file   Food Insecurity: Not on file   Transportation Needs: Not on file   Physical Activity: Not on file   Stress: Not on file   Social Connections: Not on file   Interpersonal Safety: Not At Risk (2022)    Humiliation, Afraid, Rape, and Kick questionnaire     Fear of Current or Ex-Partner: No     Emotionally Abused: No     Physically Abused: No     Sexually Abused: No   Housing Stability: Not on file          Medications  Allergies   Current Outpatient Medications   Medication Sig Dispense Refill    SUMAtriptan (IMITREX) 25 MG tablet       Allergies   Allergen Reactions    Caffeine      Other reaction(s): Other (see comments)    Erythromycin Base [Erythromycin] Unknown    Fish Oil      Other reaction(s): Arrhythmia, Palpitations  Large quantities of it (several days)-also has this with Lanolin based Vitamin D    Magnesium Sulfate Unknown    Other Allergy (See Comments) [External Allergen Needs Reconciliation - See Comment] Other (See Comments)     Magnesium sulfate (given during   "labor.)    Terbutaline Other (See Comments)         Lab Results    Chemistry/lipid CBC Cardiac Enzymes/BNP/TSH/INR   Lab Results   Component Value Date    CHOL 198 09/27/2023    HDL 76 09/27/2023    TRIG 74 09/27/2023    BUN 13.1 09/27/2023     09/27/2023    CO2 26 09/27/2023    No results found for: \"WBC\", \"HGB\", \"HCT\", \"MCV\", \"PLT\" Lab Results   Component Value Date    TSH 1.18 11/27/2023                                            "

## 2023-11-28 NOTE — LETTER
11/28/2023    Bruna Torrez MD  [address]  [place] [address]    RE: Justine Viera       Dear Colleague,     I had the pleasure of seeing Elsisherwin FELISHA Viera in the [facility].      [facility]  [phone]          Assessment/Recommendations   Patient with known history of SVT, with less symptoms recently and unable to perform ablation as could not be reproduced several years ago.  She also has frequent PVCs and a Holter monitor in 2020 showed 17,000 PVCs and short runs of relatively slow nonsustained ventricular tachycardia.  EP evaluation by Dr. Mack and low-dose beta-blocker for 3 months and then tapered off.  No evidence of reduced left ventricular systolic function at that time.    Recent medical visits for new diagnosis of breast cancer with a heart rate detected at 37.  I suspect this is related to missed PVCs as she was asymptomatic at the time.  She has had some lightheaded episodes which are very brief in nature and feels like she has the start of SVT but it does not get going.  She has had a normal stress echocardiogram in 2020 as well.    She does have a 2-week monitor on at this time and we will see if she is having significant rhythm disturbances.  Will also get an echocardiogram, given the frequency of her PVCs in the past, we would want to know that she has not developed a cardiomyopathy.    She had a normal TSH yesterday and other laboratory work was unremarkable.    Do not suspect this will hamper any surgical procedures for breast cancer, particularly if the echocardiogram shows normal left ventricular systolic function.    She will call us when the monitor is off so that we can find and review the results of the monitor.    Thank you for allowing us to participate in her care.       History of Present Illness/Subjective    Patient with a history of SVT with an attempt at ablation in approximately 2017.  This was not successful as they were  unable to initiate the SVT even with isoproterenol infusion.  She also developed premature ventricular contractions and saw Dr. Yusuf in our group also Dr. Mack, one of our electrophysiologist.  This evaluation is 2020.  Holter monitor at that time showed 17,000 PVCs over 24 hours with 15.8% of all of her heartbeats.  She had short slow runs of nonsustained ventricular tachycardia.  The ventricular tachycardia PVC morphology had a right bundle branch configuration and inferior axis suggesting left ventricular outflow tract ectopy.  Stress echocardiogram at that time showed a resting ejection fraction of 60% with mild mitral valve prolapse and moderately frequent PVCs.  The stress echo portion was normal both by EKG and LV systolic function improvement and no wall motion abnormalities.  Ventricular ectopy improved during exercise and worsened in the recovery phase.    The patient more recently was in the clinic and noted to have a heart rate of 37.  This was done using an automated cuff.  She was not having any symptoms at that time.  She has been under extra stress of late with diagnosis of breast cancer which seems to be caught early with good markers or successful therapy.  She has been evaluated for what she describes as something stuck in her throat and she has seen ears nose and throat person and sometimes can even get some discomfort in her chest which feels like something stuck in her esophagus.  She was asked to do an endoscopy and has not done that yet as it is just not been scheduled.    She denies any syncopal episodes but occasionally will get mild lightheaded feeling.  She had 1 episode where she awoke at night recently and felt a bit short of breath and it lasted about 30 seconds and then went away and she was able to sleep without difficulty.  She does build the head of the bed or up for gastroesophageal reflux.  She has no peripheral edema no history of rheumatic fever or cerebrovascular accident  "or TIA.  She does have a history of heart murmur.    She is not diabetic, is not hypertensive, but her father had a myocardial infarction at age 47 and her mom had a pacemaker.  She has no hyperlipidemia and has never smoked cigarettes.    She grew up in [place] and [place] and [place].  She is  and has worked as a homemaker.  She has 3 children 1 daughter has bicuspid aortic valve.        ECG: Personally reviewed.  Yesterday showed sinus rhythm at 73 bpm.  There are some baseline wander and some nonspecific ST changes.       Physical Examination Review of Systems   /70 (BP Location: Right arm, Patient Position: Sitting, Cuff Size: Adult Regular)   Pulse 68   Resp 14   Ht 1.607 m (5' 3.25\")   Wt 57.6 kg (127 lb)   BMI 22.32 kg/m    Body mass index is 22.32 kg/m .  Wt Readings from Last 3 Encounters:   11/28/23 57.6 kg (127 lb)   10/18/23 57.7 kg (127 lb 3.2 oz)   04/19/23 58.7 kg (129 lb 4.8 oz)     General Appearance:   Alert, cooperative and in no acute distress.   ENT/Mouth: Pink/moist oral mucosa   EYES:  no scleral icterus, normal conjunctivae   Neck: JVP normal. No Hepatojugular reflux. Thyroid not visualized.   Chest/Lungs:   Lungs are clear to auscultation, equal chest wall expansion.   Cardiovascular:   S1, S2 without murmur ,clicks or rubs. Brachial, radial and posterior tibial pulses are intact and symetric. No carotid bruits noted   Abdomen:  Nontender. BS+. No bruits.   Extremities: No cyanosis, clubbing or edema   Skin: no xanthelasma, warm.    Neurologic: normal arm movement bilateral, no tremors     Psychiatric: Appropriate affect.      Enc Vitals  BP: 116/70  Pulse: 68  Resp: 14  Weight: 57.6 kg (127 lb)  Height: 160.7 cm (5' 3.25\")                                           Medical History  Surgical History Family History Social History   Past Medical History:   Diagnosis Date    Endometrial cancer (H)     SVT (supraventricular tachycardia)     Past Surgical History: "   Procedure Laterality Date    AS INSERT VAGINAL RADIATION DEVICE      Vaginal brachytherapy    LAPAROSCOPIC HYSTERECTOMY TOTAL, BILATERAL SALPINGO-OOPHORECTOMY, NODE DISSECTION, COMBINED  12/20/2021    Robot-assisted laparoscopic hysterectomy, bilateral salpingo-oophorectomy, sentinel lymph nodes    SKIN CANCER EXCISION      Family History   Problem Relation Age of Onset    Acute Myocardial Infarction Father 47    Prostate Cancer Father     Prostate Cancer Paternal Uncle     Colon Cancer No family hx of         no overy, uterus, breast    Social History     Socioeconomic History    Marital status:      Spouse name: Not on file    Number of children: Not on file    Years of education: Not on file    Highest education level: Not on file   Occupational History    Not on file   Tobacco Use    Smoking status: Never    Smokeless tobacco: Never   Vaping Use    Vaping Use: Never used   Substance and Sexual Activity    Alcohol use: Never    Drug use: Never    Sexual activity: Not on file   Other Topics Concern    Not on file   Social History Narrative    Not on file     Social Determinants of Health     Financial Resource Strain: Not on file   Food Insecurity: Not on file   Transportation Needs: Not on file   Physical Activity: Not on file   Stress: Not on file   Social Connections: Not on file   Interpersonal Safety: Not At Risk (9/16/2022)    Humiliation, Afraid, Rape, and Kick questionnaire     Fear of Current or Ex-Partner: No     Emotionally Abused: No     Physically Abused: No     Sexually Abused: No   Housing Stability: Not on file          Medications  Allergies   Current Outpatient Medications   Medication Sig Dispense Refill    SUMAtriptan (IMITREX) 25 MG tablet       Allergies   Allergen Reactions    Caffeine      Other reaction(s): Other (see comments)    Erythromycin Base [Erythromycin] Unknown    Fish Oil      Other reaction(s): Arrhythmia, Palpitations  Large quantities of it (several days)-also has  "this with Lanolin based Vitamin D    Magnesium Sulfate Unknown    Other Allergy (See Comments) [External Allergen Needs Reconciliation - See Comment] Other (See Comments)     Magnesium sulfate (given during  labor.)    Terbutaline Other (See Comments)         Lab Results    Chemistry/lipid CBC Cardiac Enzymes/BNP/TSH/INR   Lab Results   Component Value Date    CHOL 198 2023    HDL 76 2023    TRIG 74 2023    BUN 13.1 2023     2023    CO2 26 2023    No results found for: \"WBC\", \"HGB\", \"HCT\", \"MCV\", \"PLT\" Lab Results   Component Value Date    TSH 1.18 2023                         Thank you for allowing me to participate in the care of your patient.      Sincerely,     Khoi Ware MD     [facility]  cc:   Bruna Torrez MD  [address] [place] [address]      "

## 2023-11-30 DIAGNOSIS — I49.3 PVC'S (PREMATURE VENTRICULAR CONTRACTIONS): Primary | ICD-10-CM

## 2023-12-08 ENCOUNTER — TELEPHONE (OUTPATIENT)
Dept: CARDIOLOGY | Facility: CLINIC | Age: 58
End: 2023-12-08

## 2023-12-08 ENCOUNTER — OFFICE VISIT (OUTPATIENT)
Dept: CARDIOLOGY | Facility: CLINIC | Age: 58
End: 2023-12-08
Payer: COMMERCIAL

## 2023-12-08 VITALS
RESPIRATION RATE: 16 BRPM | HEART RATE: 88 BPM | WEIGHT: 127.7 LBS | BODY MASS INDEX: 21.8 KG/M2 | SYSTOLIC BLOOD PRESSURE: 122 MMHG | DIASTOLIC BLOOD PRESSURE: 72 MMHG | HEIGHT: 64 IN

## 2023-12-08 DIAGNOSIS — I49.3 PVC'S (PREMATURE VENTRICULAR CONTRACTIONS): ICD-10-CM

## 2023-12-08 DIAGNOSIS — R53.83 FATIGUE, UNSPECIFIED TYPE: ICD-10-CM

## 2023-12-08 DIAGNOSIS — R07.89 CHEST PRESSURE: Primary | ICD-10-CM

## 2023-12-08 PROCEDURE — 99214 OFFICE O/P EST MOD 30 MIN: CPT | Performed by: INTERNAL MEDICINE

## 2023-12-08 RX ORDER — METOPROLOL SUCCINATE 25 MG/1
25 TABLET, EXTENDED RELEASE ORAL AT BEDTIME
Qty: 90 TABLET | Refills: 11 | Status: SHIPPED | OUTPATIENT
Start: 2023-12-08 | End: 2024-03-08

## 2023-12-08 NOTE — PROGRESS NOTES
Thank you, Dr. Doyle ref. provider found, for asking the [facility] team to see Ms. Justine Viera to evaluate       Assessment/Recommendations   Assessment/Plan:  Pre op - pt with chest pressure last week, ok to proceed with conscious sedation but for general anesthesia given family hx/gestational DM (although normal trig), with chest/back pressure last week will set up regadenosine nuclear imaging   CM mild reduction in EF compared to previous echo, await stress nuclear and cardiac MRI in January- most likely due to PVC and some SVT noted on monitor - will start metoprolol succinate 25mg (she was on atenolol 12.5 to 25mg in the past), await input from EP for consideration of ablation especially if no relief or sig side effects with metoprolol  Mild MR - no change from past  CV prevention - , await stress nuclear, defer to Dr. Torrez    Follow up with Dr. Ware     History of Present Illness/Subjective    Ms. Justine Viera is a 58 year old female with hx of SVT s/p ablation, father with hx of MI and CABG in 40's, pt with palpitations, no hx of syncopal event, no prior CVA/MI, no tob/DM (other than gestational diabetes), last  9/2023, normal TSH, echo with EF 45-50% with mild global hypokinesis, atrial septal aneurysm, mild MR no exertional chest pain/pressure, but chest pressure/back pain that was relieved post visit to chiropractor.    MRA 2016 nromal EF no delayed inhancement.  Stress echo 2020 no ischemia, mild MV prolapse with midl regurg,  Diagnosed with breast cancer last week and had endometrial cancer last week with plans for surgery - double mastectomy.   She stopped fish oil and palp weaned down.  She weaned off atenolol as palpitations were not an issues, now with recent diagnosis palpitations have worsened but better this week compared to last week.  She has noticed a decline in exercise tolerance in the past with bike exercise and walking over the past month.  No  "PND/rthopnea except for once last week, she woke up an dher pulse was 37.   Event monitor reviwed from [facility] with pvc/bigeminy.           Physical Examination Review of Systems   /72 (BP Location: Left arm, Patient Position: Sitting, Cuff Size: Adult Regular)   Pulse 88   Resp 16   Ht 1.619 m (5' 3.75\")   Wt 57.9 kg (127 lb 11.2 oz)   BMI 22.09 kg/m    Body mass index is 22.09 kg/m .  Wt Readings from Last 3 Encounters:   12/08/23 57.9 kg (127 lb 11.2 oz)   11/28/23 57.6 kg (127 lb)   10/18/23 57.7 kg (127 lb 3.2 oz)     [unfilled]  General Appearance:   no distress, normal body habitus   ENT/Mouth: membranes moist, no oral lesions or bleeding gums.      EYES:  no scleral icterus, normal conjunctivae   Neck: no carotid bruits or thyromegaly   Chest/Lungs:   lungs are clear to auscultation, no rales or wheezing,  sternal scar, equal chest wall expansion    Cardiovascular:   Regular. Normal first and second heart sounds with no murmurs, rubs, or gallops; the carotid, radial and posterior tibial pulses are intact, Jugular venous pressure , edema bilaterally    Abdomen:  no organomegaly, masses, bruits, or tenderness; bowel sounds are present   Extremities: no cyanosis or clubbing   Skin: no xanthelasma, warm.    Neurologic: normal  bilateral, no tremors     Psychiatric: alert and oriented x3, calm     Review of Systems - 12 points nega other than above      Medical History  Surgical History Family History Social History   Past Medical History:   Diagnosis Date    Endometrial cancer (H)     SVT (supraventricular tachycardia)     Past Surgical History:   Procedure Laterality Date    AS INSERT VAGINAL RADIATION DEVICE      Vaginal brachytherapy    LAPAROSCOPIC HYSTERECTOMY TOTAL, BILATERAL SALPINGO-OOPHORECTOMY, NODE DISSECTION, COMBINED  12/20/2021    Robot-assisted laparoscopic hysterectomy, bilateral salpingo-oophorectomy, sentinel lymph nodes    SKIN CANCER EXCISION      Family History   Problem " Relation Age of Onset    Acute Myocardial Infarction Father 47    Prostate Cancer Father     Prostate Cancer Paternal Uncle     Colon Cancer No family hx of         no overy, uterus, breast    Social History     Socioeconomic History    Marital status:      Spouse name: Not on file    Number of children: Not on file    Years of education: Not on file    Highest education level: Not on file   Occupational History    Not on file   Tobacco Use    Smoking status: Never    Smokeless tobacco: Never   Vaping Use    Vaping Use: Never used   Substance and Sexual Activity    Alcohol use: Never    Drug use: Never    Sexual activity: Not on file   Other Topics Concern    Not on file   Social History Narrative    Not on file     Social Determinants of Health     Financial Resource Strain: Not on file   Food Insecurity: Not on file   Transportation Needs: Not on file   Physical Activity: Not on file   Stress: Not on file   Social Connections: Not on file   Interpersonal Safety: Not At Risk (2022)    Humiliation, Afraid, Rape, and Kick questionnaire     Fear of Current or Ex-Partner: No     Emotionally Abused: No     Physically Abused: No     Sexually Abused: No   Housing Stability: Not on file          Medications  Allergies   Scheduled Meds:  Continuous Infusions:  PRN Meds:. Allergies   Allergen Reactions    Caffeine      Other reaction(s): Other (see comments)    Erythromycin Base [Erythromycin] Unknown    Fish Oil      Other reaction(s): Arrhythmia, Palpitations  Large quantities of it (several days)-also has this with Lanolin based Vitamin D    Magnesium Sulfate Unknown    Other Allergy (See Comments) [External Allergen Needs Reconciliation - See Comment] Other (See Comments)     Magnesium sulfate (given during  labor.)    Terbutaline Other (See Comments)         Lab Results    Chemistry/lipid CBC Cardiac Enzymes/BNP/TSH/INR   Lab Results   Component Value Date    CHOL 198 2023    HDL 76 2023  "   TRIG 74 09/27/2023    BUN 13.1 09/27/2023     09/27/2023    CO2 26 09/27/2023    No results found for: \"WBC\", \"HGB\", \"HCT\", \"MCV\", \"PLT\" Lab Results   Component Value Date    TSH 1.18 11/27/2023              Louis Fam MD  Interventional Cardiology  [facility]            "

## 2023-12-08 NOTE — LETTER
12/8/2023    Bruna Torrez MD  [address]  [place] [address]    RE: Justine Viera       Dear Colleague,     I had the pleasure of seeing Justine Viera in the [facility].    Thank you, Dr. Doyle ref. provider found, for asking the [facility] team to see Ms. Justine Viera to evaluate       Assessment/Recommendations   Assessment/Plan:  Pre op - pt with chest pressure last week, ok to proceed with conscious sedation but for general anesthesia given family hx/gestational DM (although normal trig), with chest/back pressure last week will set up regadenosine nuclear imaging   CM mild reduction in EF compared to previous echo, await stress nuclear and cardiac MRI in January- most likely due to PVC and some SVT noted on monitor - will start metoprolol succinate 25mg (she was on atenolol 12.5 to 25mg in the past), await input from EP for consideration of ablation especially if no relief or sig side effects with metoprolol  Mild MR - no change from past  CV prevention - , await stress nuclear, defer to Dr. Torrez    Follow up with Dr. Ware     History of Present Illness/Subjective    Ms. Justine Viera is a 58 year old female with hx of SVT s/p ablation, father with hx of MI and CABG in 40's, pt with palpitations, no hx of syncopal event, no prior CVA/MI, no tob/DM (other than gestational diabetes), last  9/2023, normal TSH, echo with EF 45-50% with mild global hypokinesis, atrial septal aneurysm, mild MR no exertional chest pain/pressure, but chest pressure/back pain that was relieved post visit to chiropractor.    MRA 2016 nromal EF no delayed inhancement.  Stress echo 2020 no ischemia, mild MV prolapse with midl regurg,  Diagnosed with breast cancer last week and had endometrial cancer last week with plans for surgery - double mastectomy.   She stopped fish oil and palp weaned down.  She weaned off atenolol as palpitations were not an issues, now  "with recent diagnosis palpitations have worsened but better this week compared to last week.  She has noticed a decline in exercise tolerance in the past with bike exercise and walking over the past month.  No PND/rthopnea except for once last week, she woke up an dher pulse was 37.   Event monitor reviwed from [employer] with pvc/bigeminy.           Physical Examination Review of Systems   /72 (BP Location: Left arm, Patient Position: Sitting, Cuff Size: Adult Regular)   Pulse 88   Resp 16   Ht 1.619 m (5' 3.75\")   Wt 57.9 kg (127 lb 11.2 oz)   BMI 22.09 kg/m    Body mass index is 22.09 kg/m .  Wt Readings from Last 3 Encounters:   12/08/23 57.9 kg (127 lb 11.2 oz)   11/28/23 57.6 kg (127 lb)   10/18/23 57.7 kg (127 lb 3.2 oz)     [unfilled]  General Appearance:   no distress, normal body habitus   ENT/Mouth: membranes moist, no oral lesions or bleeding gums.      EYES:  no scleral icterus, normal conjunctivae   Neck: no carotid bruits or thyromegaly   Chest/Lungs:   lungs are clear to auscultation, no rales or wheezing,  sternal scar, equal chest wall expansion    Cardiovascular:   Regular. Normal first and second heart sounds with no murmurs, rubs, or gallops; the carotid, radial and posterior tibial pulses are intact, Jugular venous pressure , edema bilaterally    Abdomen:  no organomegaly, masses, bruits, or tenderness; bowel sounds are present   Extremities: no cyanosis or clubbing   Skin: no xanthelasma, warm.    Neurologic: normal  bilateral, no tremors     Psychiatric: alert and oriented x3, calm     Review of Systems - 12 points nega other than above      Medical History  Surgical History Family History Social History   Past Medical History:   Diagnosis Date    Endometrial cancer (H)     SVT (supraventricular tachycardia)     Past Surgical History:   Procedure Laterality Date    AS INSERT VAGINAL RADIATION DEVICE      Vaginal brachytherapy    LAPAROSCOPIC HYSTERECTOMY TOTAL, BILATERAL " SALPINGO-OOPHORECTOMY, NODE DISSECTION, COMBINED  12/20/2021    Robot-assisted laparoscopic hysterectomy, bilateral salpingo-oophorectomy, sentinel lymph nodes    SKIN CANCER EXCISION      Family History   Problem Relation Age of Onset    Acute Myocardial Infarction Father 47    Prostate Cancer Father     Prostate Cancer Paternal Uncle     Colon Cancer No family hx of         no overy, uterus, breast    Social History     Socioeconomic History    Marital status:      Spouse name: Not on file    Number of children: Not on file    Years of education: Not on file    Highest education level: Not on file   Occupational History    Not on file   Tobacco Use    Smoking status: Never    Smokeless tobacco: Never   Vaping Use    Vaping Use: Never used   Substance and Sexual Activity    Alcohol use: Never    Drug use: Never    Sexual activity: Not on file   Other Topics Concern    Not on file   Social History Narrative    Not on file     Social Determinants of Health     Financial Resource Strain: Not on file   Food Insecurity: Not on file   Transportation Needs: Not on file   Physical Activity: Not on file   Stress: Not on file   Social Connections: Not on file   Interpersonal Safety: Not At Risk (9/16/2022)    Humiliation, Afraid, Rape, and Kick questionnaire     Fear of Current or Ex-Partner: No     Emotionally Abused: No     Physically Abused: No     Sexually Abused: No   Housing Stability: Not on file          Medications  Allergies   Scheduled Meds:  Continuous Infusions:  PRN Meds:. Allergies   Allergen Reactions    Caffeine      Other reaction(s): Other (see comments)    Erythromycin Base [Erythromycin] Unknown    Fish Oil      Other reaction(s): Arrhythmia, Palpitations  Large quantities of it (several days)-also has this with Lanolin based Vitamin D    Magnesium Sulfate Unknown    Other Allergy (See Comments) [External Allergen Needs Reconciliation - See Comment] Other (See Comments)     Magnesium sulfate  "(given during  labor.)    Terbutaline Other (See Comments)         Lab Results    Chemistry/lipid CBC Cardiac Enzymes/BNP/TSH/INR   Lab Results   Component Value Date    CHOL 198 2023    HDL 76 2023    TRIG 74 2023    BUN 13.1 2023     2023    CO2 26 2023    No results found for: \"WBC\", \"HGB\", \"HCT\", \"MCV\", \"PLT\" Lab Results   Component Value Date    TSH 1.18 2023          Louis Fam MD  Interventional Cardiology  [facility]    Thank you for allowing me to participate in the care of your patient.      Sincerely,     Louis Fam MD     [facility]  cc:   No referring provider defined for this encounter.  "

## 2023-12-08 NOTE — TELEPHONE ENCOUNTER
[facility] Call Center    Phone Message    May a detailed message be left on voicemail: yes     Reason for Call: Other: Pt was denied an endoscopy because it has to be in a hospital setting. She wanted to inform Dr. Fam. Thank you     Action Taken: Message routed to:  Other: Cardiology    Travel Screening: Not Applicable      Thank you!  [facility]

## 2023-12-08 NOTE — PATIENT INSTRUCTIONS
Pre op - await stress nuclear study to be certain all ok with heart vessels  Start metoprolol to suppress extra heart beats (PAC, SVT and PVC)  Meet on Jan 3rd with electrophysiologist   Have cardiac MRI in January to check for infiltrative process (rare)    Follow up with me in 3 months    Ok to have conscious sedation for endoscopy today.  You could have extra beats (PVC/PAC).

## 2023-12-08 NOTE — TELEPHONE ENCOUNTER
Noted-MORRIS      From: Louis Fam MD  Sent: 12/8/2023   4:37 PM CST  To: Ivis Alaniz RN    Await results of stress test

## 2023-12-12 ENCOUNTER — HOSPITAL ENCOUNTER (OUTPATIENT)
Dept: NUCLEAR MEDICINE | Facility: HOSPITAL | Age: 58
Discharge: HOME | End: 2023-12-12
Attending: INTERNAL MEDICINE
Payer: COMMERCIAL

## 2023-12-12 ENCOUNTER — HOSPITAL ENCOUNTER (OUTPATIENT)
Dept: CARDIOLOGY | Facility: HOSPITAL | Age: 58
Discharge: HOME | End: 2023-12-12
Attending: INTERNAL MEDICINE
Payer: COMMERCIAL

## 2023-12-12 DIAGNOSIS — R07.89 CHEST PRESSURE: ICD-10-CM

## 2023-12-12 DIAGNOSIS — R53.83 FATIGUE, UNSPECIFIED TYPE: ICD-10-CM

## 2023-12-12 LAB
CV STRESS CURRENT BP HE: NORMAL
CV STRESS CURRENT HR HE: 69
CV STRESS CURRENT HR HE: 73
CV STRESS CURRENT HR HE: 75
CV STRESS CURRENT HR HE: 75
CV STRESS CURRENT HR HE: 76
CV STRESS CURRENT HR HE: 77
CV STRESS CURRENT HR HE: 77
CV STRESS CURRENT HR HE: 78
CV STRESS CURRENT HR HE: 78
CV STRESS CURRENT HR HE: 79
CV STRESS CURRENT HR HE: 79
CV STRESS CURRENT HR HE: 81
CV STRESS CURRENT HR HE: 84
CV STRESS CURRENT HR HE: 85
CV STRESS CURRENT HR HE: 94
CV STRESS CURRENT HR HE: 96
CV STRESS DEVIATION TIME HE: NORMAL
CV STRESS ECHO PERCENT HR HE: NORMAL
CV STRESS EXERCISE STAGE HE: NORMAL
CV STRESS FINAL RESTING BP HE: NORMAL
CV STRESS FINAL RESTING HR HE: 75
CV STRESS MAX HR HE: 98
CV STRESS MAX TREADMILL GRADE HE: 0
CV STRESS MAX TREADMILL SPEED HE: 0
CV STRESS PEAK DIA BP HE: NORMAL
CV STRESS PEAK SYS BP HE: NORMAL
CV STRESS PHASE HE: NORMAL
CV STRESS PROTOCOL HE: NORMAL
CV STRESS RESTING PT POSITION HE: NORMAL
CV STRESS ST DEVIATION AMOUNT HE: NORMAL
CV STRESS ST DEVIATION ELEVATION HE: NORMAL
CV STRESS ST EVELATION AMOUNT HE: NORMAL
CV STRESS TEST TYPE HE: NORMAL
CV STRESS TOTAL STAGE TIME MIN 1 HE: NORMAL
NUC STRESS EJECTION FRACTION: 66 %
RATE PRESSURE PRODUCT: NORMAL
STRESS ECHO BASELINE DIASTOLIC HE: 65
STRESS ECHO BASELINE HR: 74
STRESS ECHO BASELINE SYSTOLIC BP: 141
STRESS ECHO CALCULATED PERCENT HR: 60 %
STRESS ECHO LAST STRESS DIASTOLIC BP: 68
STRESS ECHO LAST STRESS HR: 77
STRESS ECHO LAST STRESS SYSTOLIC BP: 129
STRESS ECHO TARGET HR: 162

## 2023-12-12 PROCEDURE — 93016 CV STRESS TEST SUPVJ ONLY: CPT | Performed by: INTERNAL MEDICINE

## 2023-12-12 PROCEDURE — 343N000001 HC RX 343: Performed by: INTERNAL MEDICINE

## 2023-12-12 PROCEDURE — 93017 CV STRESS TEST TRACING ONLY: CPT

## 2023-12-12 PROCEDURE — A9500 TC99M SESTAMIBI: HCPCS | Performed by: INTERNAL MEDICINE

## 2023-12-12 PROCEDURE — 93018 CV STRESS TEST I&R ONLY: CPT | Performed by: INTERNAL MEDICINE

## 2023-12-12 PROCEDURE — 250N000011 HC RX IP 250 OP 636: Mod: JZ | Performed by: INTERNAL MEDICINE

## 2023-12-12 PROCEDURE — 78452 HT MUSCLE IMAGE SPECT MULT: CPT

## 2023-12-12 PROCEDURE — 78452 HT MUSCLE IMAGE SPECT MULT: CPT | Mod: 26 | Performed by: INTERNAL MEDICINE

## 2023-12-12 RX ORDER — REGADENOSON 0.08 MG/ML
0.4 INJECTION, SOLUTION INTRAVENOUS ONCE
Status: COMPLETED | OUTPATIENT
Start: 2023-12-12 | End: 2023-12-12

## 2023-12-12 RX ADMIN — REGADENOSON 0.4 MG: 0.08 INJECTION, SOLUTION INTRAVENOUS at 13:38

## 2023-12-12 RX ADMIN — Medication 8.3 MILLICURIE: at 12:22

## 2023-12-12 RX ADMIN — Medication 30.3 MILLICURIE: at 14:41

## 2023-12-20 ENCOUNTER — LAB REQUISITION (OUTPATIENT)
Dept: LAB | Facility: CLINIC | Age: 58
End: 2023-12-20

## 2023-12-20 DIAGNOSIS — Z86.79 PERSONAL HISTORY OF OTHER DISEASES OF THE CIRCULATORY SYSTEM: ICD-10-CM

## 2023-12-20 PROCEDURE — 80048 BASIC METABOLIC PNL TOTAL CA: CPT | Performed by: FAMILY MEDICINE

## 2023-12-21 LAB
ANION GAP SERPL CALCULATED.3IONS-SCNC: 12 MMOL/L (ref 7–15)
BUN SERPL-MCNC: 15.9 MG/DL (ref 6–20)
CALCIUM SERPL-MCNC: 9.6 MG/DL (ref 8.6–10)
CHLORIDE SERPL-SCNC: 104 MMOL/L (ref 98–107)
CREAT SERPL-MCNC: 0.76 MG/DL (ref 0.51–0.95)
DEPRECATED HCO3 PLAS-SCNC: 25 MMOL/L (ref 22–29)
EGFRCR SERPLBLD CKD-EPI 2021: 90 ML/MIN/1.73M2
GLUCOSE SERPL-MCNC: 102 MG/DL (ref 70–99)
POTASSIUM SERPL-SCNC: 4.6 MMOL/L (ref 3.4–5.3)
SODIUM SERPL-SCNC: 141 MMOL/L (ref 135–145)

## 2024-01-02 NOTE — PROGRESS NOTES
HEART CARE ENCOUNTER CONSULTATON NOTE      [facility]  [phone]      Assessment/Recommendations   Assessment/Plan:    Justine Viera is a very pleasant 58 year old female HFrEF(LVEF 45 to 50%), breast cancer s/p double mastectomy, endometrial cancer treated with surgery and vaginal brachytherapy, who presents today to the EP clinic.    PVCs  - symptomatic with palpitations, shortness of breath.  Unifocal morphology.  12.5% burden by ambulatory rhythm monitoring.  No ECG evidence of arrhythmogenic substrate.  Mildy reduced LV systolic function, no heart failure symptoms.    We reviewed PVCs and management options including expectant management, medical therapy including antiarrhythmic drug therapy, and PVC mapping and ablation.    -We will continue medications at this time since she has felt much better since starting the metoprolol.   -Will get a repeat Holter monitor 2 weeks prior to her next clinic visit with me which will be 4 months from now    2.  HFrEF  -LVEF 45 to 50% in November 2023  -She was scheduled for a cardiac MRI on 8 January 2024.  Since she has a breast surgery on 5 January 2024, we will reschedule her cardiac MRI to approximately 3 months later.  -Continue metoprolol in the interim    3.  Breast cancer  -Has an upcoming surgery on 5 January  -Reconstruction surgery approximately 3 months later    Time spent: 60 minutes spent on the date of the encounter doing chart review, history and exam, documentation and further activities as noted above.         History of Present Illness/Subjective    HPI: Justine Viera is a very pleasant 58 year old female HFrEF(LVEF 45 to 50%), breast cancer s/p double mastectomy, endometrial cancer treated with surgery and vaginal brachytherapy, who presents today to the EP clinic.    Justine started having symptoms prior to Thanksgiving 2023, she had symptoms of skipped beats and felt weaker and short of breath. She has felt better since  "starting Metoprolol.  She has been diagnosed with breast cancer and has an upcoming surgery on 5 January.  She also has reconstructive surgery 3 months after the breast surgery.    She has had symptoms like this in the past as well going back to 2013.  She was on atenolol in the past but had some side effects(described as emotional numbness) from it so stopped taking it.    Recent ECG(personally reviewed):    Recent Zio patch monitor showed a PVC burden of 12.5%  Approximately 7 days of monitoring  Heart rate ranged from 49 to 171 bpm    Recent Echocardiogram Results (personally reviewed):    Interpretation Summary     The left ventricle is normal in size.  There is normal left ventricular wall thickness.  The visual ejection fraction is 45-50%.  There is mild global hypokinesia of the left ventricle.  Normal right ventricle size and systolic function.  Normal left atrial size.  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  There is mild (1+) mitral regurgitation.     No previous study for comparison.    Recent Stress test Results (personally reviewed):      Lexiscan stress ECG negative for ischemia.    The nuclear stress test is negative for inducible myocardial ischemia or infarction.    The left ventricular ejection fraction at stress is 66%.    There is no prior study for comparison.    Labs below reviewed personally     Physical Examination  Review of Systems   Vitals: /72 (BP Location: Left arm, Patient Position: Sitting, Cuff Size: Adult Regular)   Pulse 53   Resp 16   Ht 1.619 m (5' 3.75\")   Wt 57.2 kg (126 lb)   BMI 21.80 kg/m    BMI= Body mass index is 21.8 kg/m .  Wt Readings from Last 3 Encounters:   01/03/24 57.2 kg (126 lb)   12/08/23 57.9 kg (127 lb 11.2 oz)   11/28/23 57.6 kg (127 lb)       General Appearance:   no distress, normal body habitus   ENT/Mouth: membranes moist, no oral lesions or bleeding gums.      EYES:  no scleral icterus, normal conjunctivae   Neck: no carotid bruits or thyromegaly "   Chest/Lungs:   lungs are clear to auscultation, no rales or wheezing, no sternal scar, equal chest wall expansion    Cardiovascular:   Regular. Normal first and second heart sounds with no murmurs, rubs, or gallops; the carotid, radial and posterior tibial pulses are intact, no edema bilaterally    Abdomen:  no organomegaly, masses, bruits, or tenderness; bowel sounds are present   Extremities: no cyanosis or clubbing   Skin: no xanthelasma, warm.    Neurologic: normal  bilateral, no tremors     Psychiatric: alert and oriented x3, calm        Please refer above for cardiac ROS details.        Medical History  Surgical History Family History Social History   Past Medical History:   Diagnosis Date    Endometrial cancer (H)     SVT (supraventricular tachycardia)      Past Surgical History:   Procedure Laterality Date    AS INSERT VAGINAL RADIATION DEVICE      Vaginal brachytherapy    LAPAROSCOPIC HYSTERECTOMY TOTAL, BILATERAL SALPINGO-OOPHORECTOMY, NODE DISSECTION, COMBINED  12/20/2021    Robot-assisted laparoscopic hysterectomy, bilateral salpingo-oophorectomy, sentinel lymph nodes    SKIN CANCER EXCISION       Family History   Problem Relation Age of Onset    Acute Myocardial Infarction Father 47    Prostate Cancer Father     Prostate Cancer Paternal Uncle     Colon Cancer No family hx of         no overy, uterus, breast        Social History     Socioeconomic History    Marital status:      Spouse name: Not on file    Number of children: Not on file    Years of education: Not on file    Highest education level: Not on file   Occupational History    Not on file   Tobacco Use    Smoking status: Never    Smokeless tobacco: Never   Vaping Use    Vaping Use: Never used   Substance and Sexual Activity    Alcohol use: Never    Drug use: Never    Sexual activity: Not on file   Other Topics Concern    Not on file   Social History Narrative    Not on file     Social Determinants of Health     Financial Resource  "Strain: Not on file   Food Insecurity: Not on file   Transportation Needs: Not on file   Physical Activity: Not on file   Stress: Not on file   Social Connections: Not on file   Interpersonal Safety: Not At Risk (2022)    Humiliation, Afraid, Rape, and Kick questionnaire     Fear of Current or Ex-Partner: No     Emotionally Abused: No     Physically Abused: No     Sexually Abused: No   Housing Stability: Not on file           Medications  Allergies   Current Outpatient Medications   Medication Sig Dispense Refill    metoprolol succinate ER (TOPROL XL) 25 MG 24 hr tablet Take 1 tablet (25 mg) by mouth at bedtime 90 tablet 11    SUMAtriptan (IMITREX) 25 MG tablet Take 25 mg by mouth at onset of headache         Allergies   Allergen Reactions    Caffeine      Other reaction(s): Other (see comments)    Erythromycin Base [Erythromycin] Unknown    Fish Oil      Other reaction(s): Arrhythmia, Palpitations  Large quantities of it (several days)-also has this with Lanolin based Vitamin D    Magnesium Sulfate Unknown    Other Allergy (See Comments) [External Allergen Needs Reconciliation - See Comment] Other (See Comments)     Magnesium sulfate (given during  labor.)    Terbutaline Other (See Comments)          Lab Results    Chemistry/lipid CBC Cardiac Enzymes/BNP/TSH/INR   Recent Labs   Lab Test 23  1252   CHOL 198   HDL 76   *   TRIG 74     Recent Labs   Lab Test 23  1252   *     Recent Labs   Lab Test 23  1132      POTASSIUM 4.6   CHLORIDE 104   CO2 25   *   BUN 15.9   CR 0.76   GFRESTIMATED 90   ISAIAS 9.6     Recent Labs   Lab Test 23  1132 23  1252 12/15/21  1622   CR 0.76 0.65 0.66     No results for input(s): \"A1C\" in the last 39356 hours.       No results for input(s): \"WBC\", \"HGB\", \"HCT\", \"MCV\", \"PLT\" in the last 14134 hours.  No results for input(s): \"HGB\" in the last 53632 hours. No results for input(s): \"TROPONINI\" in the last 92650 hours.  No " "results for input(s): \"BNP\", \"NTBNPI\", \"NTBNP\" in the last 88504 hours.  Recent Labs   Lab Test 11/27/23  1316   TSH 1.18     No results for input(s): \"INR\" in the last 66780 hours.     Dwain Ramos MD                                      "

## 2024-01-03 ENCOUNTER — OFFICE VISIT (OUTPATIENT)
Dept: CARDIOLOGY | Facility: CLINIC | Age: 59
End: 2024-01-03
Payer: COMMERCIAL

## 2024-01-03 VITALS
SYSTOLIC BLOOD PRESSURE: 106 MMHG | RESPIRATION RATE: 16 BRPM | HEART RATE: 53 BPM | WEIGHT: 126 LBS | DIASTOLIC BLOOD PRESSURE: 72 MMHG | HEIGHT: 64 IN | BODY MASS INDEX: 21.51 KG/M2

## 2024-01-03 DIAGNOSIS — R00.1 BRADYCARDIA: ICD-10-CM

## 2024-01-03 DIAGNOSIS — I49.3 PVC'S (PREMATURE VENTRICULAR CONTRACTIONS): ICD-10-CM

## 2024-01-03 DIAGNOSIS — I49.3 PVC'S (PREMATURE VENTRICULAR CONTRACTIONS): Primary | ICD-10-CM

## 2024-01-03 PROCEDURE — 99205 OFFICE O/P NEW HI 60 MIN: CPT | Performed by: INTERNAL MEDICINE

## 2024-01-03 PROCEDURE — 93000 ELECTROCARDIOGRAM COMPLETE: CPT | Performed by: GENERAL ACUTE CARE HOSPITAL

## 2024-01-03 NOTE — PATIENT INSTRUCTIONS
[facility]  Cardiac Electrophysiology  [address]  [place] [address]   Office: [phone]  Fax: [phone]       Thank you for seeing us in clinic today - it is a pleasure to be a part of your care team.  Below is a summary of our plan from today's visit.      Continue metoprolol  We will get an MRI and Holter prior to your next visit      Please do not hesitate to be in touch with our office at [phone] with any questions that may arise.      Thank you for trusting us with your care,    Dwain Ramos MD  Clinical Cardiac Electrophysiology  [facility]  [address]  [place] [address]   Office: [phone]  Fax: [phone]

## 2024-01-03 NOTE — LETTER
1/3/2024    Bruna Torrez MD  [address]  [place] [address]    RE: Justine Viera       Dear Colleague,     I had the pleasure of seeing Justine Viera in the [facility].    HEART CARE ENCOUNTER CONSULTATON NOTE      [facility]  [phone]      Assessment/Recommendations   Assessment/Plan:    Justine Viera is a very pleasant 58 year old female HFrEF(LVEF 45 to 50%), breast cancer s/p double mastectomy, endometrial cancer treated with surgery and vaginal brachytherapy, who presents today to the EP clinic.    PVCs  - symptomatic with palpitations, shortness of breath.  Unifocal morphology.  12.5% burden by ambulatory rhythm monitoring.  No ECG evidence of arrhythmogenic substrate.  Mildy reduced LV systolic function, no heart failure symptoms.    We reviewed PVCs and management options including expectant management, medical therapy including antiarrhythmic drug therapy, and PVC mapping and ablation.    -We will continue medications at this time since she has felt much better since starting the metoprolol.   -Will get a repeat Holter monitor 2 weeks prior to her next clinic visit with me which will be 4 months from now    2.  HFrEF  -LVEF 45 to 50% in November 2023  -She was scheduled for a cardiac MRI on 8 January 2024.  Since she has a breast surgery on 5 January 2024, we will reschedule her cardiac MRI to approximately 3 months later.  -Continue metoprolol in the interim    3.  Breast cancer  -Has an upcoming surgery on 5 January  -Reconstruction surgery approximately 3 months later    Time spent: 60 minutes spent on the date of the encounter doing chart review, history and exam, documentation and further activities as noted above.         History of Present Illness/Subjective    HPI: Justine Viera is a very pleasant 58 year old female HFrEF(LVEF 45 to 50%), breast cancer s/p double mastectomy, endometrial cancer treated with surgery and vaginal  "brachytherapy, who presents today to the EP clinic.    Justine started having symptoms prior to Thanksgiving 2023, she had symptoms of skipped beats and felt weaker and short of breath. She has felt better since starting Metoprolol.  She has been diagnosed with breast cancer and has an upcoming surgery on 5 January.  She also has reconstructive surgery 3 months after the breast surgery.    She has had symptoms like this in the past as well going back to 2013.  She was on atenolol in the past but had some side effects(described as emotional numbness) from it so stopped taking it.    Recent ECG(personally reviewed):    Recent Zio patch monitor showed a PVC burden of 12.5%  Approximately 7 days of monitoring  Heart rate ranged from 49 to 171 bpm    Recent Echocardiogram Results (personally reviewed):    Interpretation Summary     The left ventricle is normal in size.  There is normal left ventricular wall thickness.  The visual ejection fraction is 45-50%.  There is mild global hypokinesia of the left ventricle.  Normal right ventricle size and systolic function.  Normal left atrial size.  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  There is mild (1+) mitral regurgitation.     No previous study for comparison.    Recent Stress test Results (personally reviewed):      Lexiscan stress ECG negative for ischemia.    The nuclear stress test is negative for inducible myocardial ischemia or infarction.    The left ventricular ejection fraction at stress is 66%.    There is no prior study for comparison.    Labs below reviewed personally     Physical Examination  Review of Systems   Vitals: /72 (BP Location: Left arm, Patient Position: Sitting, Cuff Size: Adult Regular)   Pulse 53   Resp 16   Ht 1.619 m (5' 3.75\")   Wt 57.2 kg (126 lb)   BMI 21.80 kg/m    BMI= Body mass index is 21.8 kg/m .  Wt Readings from Last 3 Encounters:   01/03/24 57.2 kg (126 lb)   12/08/23 57.9 kg (127 lb 11.2 oz)   11/28/23 57.6 kg (127 lb) "       General Appearance:   no distress, normal body habitus   ENT/Mouth: membranes moist, no oral lesions or bleeding gums.      EYES:  no scleral icterus, normal conjunctivae   Neck: no carotid bruits or thyromegaly   Chest/Lungs:   lungs are clear to auscultation, no rales or wheezing, no sternal scar, equal chest wall expansion    Cardiovascular:   Regular. Normal first and second heart sounds with no murmurs, rubs, or gallops; the carotid, radial and posterior tibial pulses are intact, no edema bilaterally    Abdomen:  no organomegaly, masses, bruits, or tenderness; bowel sounds are present   Extremities: no cyanosis or clubbing   Skin: no xanthelasma, warm.    Neurologic: normal  bilateral, no tremors     Psychiatric: alert and oriented x3, calm        Please refer above for cardiac ROS details.        Medical History  Surgical History Family History Social History   Past Medical History:   Diagnosis Date    Endometrial cancer (H)     SVT (supraventricular tachycardia)      Past Surgical History:   Procedure Laterality Date    AS INSERT VAGINAL RADIATION DEVICE      Vaginal brachytherapy    LAPAROSCOPIC HYSTERECTOMY TOTAL, BILATERAL SALPINGO-OOPHORECTOMY, NODE DISSECTION, COMBINED  12/20/2021    Robot-assisted laparoscopic hysterectomy, bilateral salpingo-oophorectomy, sentinel lymph nodes    SKIN CANCER EXCISION       Family History   Problem Relation Age of Onset    Acute Myocardial Infarction Father 47    Prostate Cancer Father     Prostate Cancer Paternal Uncle     Colon Cancer No family hx of         no overy, uterus, breast        Social History     Socioeconomic History    Marital status:      Spouse name: Not on file    Number of children: Not on file    Years of education: Not on file    Highest education level: Not on file   Occupational History    Not on file   Tobacco Use    Smoking status: Never    Smokeless tobacco: Never   Vaping Use    Vaping Use: Never used   Substance and Sexual  "Activity    Alcohol use: Never    Drug use: Never    Sexual activity: Not on file   Other Topics Concern    Not on file   Social History Narrative    Not on file     Social Determinants of Health     Financial Resource Strain: Not on file   Food Insecurity: Not on file   Transportation Needs: Not on file   Physical Activity: Not on file   Stress: Not on file   Social Connections: Not on file   Interpersonal Safety: Not At Risk (2022)    Humiliation, Afraid, Rape, and Kick questionnaire     Fear of Current or Ex-Partner: No     Emotionally Abused: No     Physically Abused: No     Sexually Abused: No   Housing Stability: Not on file           Medications  Allergies   Current Outpatient Medications   Medication Sig Dispense Refill    metoprolol succinate ER (TOPROL XL) 25 MG 24 hr tablet Take 1 tablet (25 mg) by mouth at bedtime 90 tablet 11    SUMAtriptan (IMITREX) 25 MG tablet Take 25 mg by mouth at onset of headache         Allergies   Allergen Reactions    Caffeine      Other reaction(s): Other (see comments)    Erythromycin Base [Erythromycin] Unknown    Fish Oil      Other reaction(s): Arrhythmia, Palpitations  Large quantities of it (several days)-also has this with Lanolin based Vitamin D    Magnesium Sulfate Unknown    Other Allergy (See Comments) [External Allergen Needs Reconciliation - See Comment] Other (See Comments)     Magnesium sulfate (given during  labor.)    Terbutaline Other (See Comments)          Lab Results    Chemistry/lipid CBC Cardiac Enzymes/BNP/TSH/INR   Recent Labs   Lab Test 23  1252   CHOL 198   HDL 76   *   TRIG 74     Recent Labs   Lab Test 23  1252   *     Recent Labs   Lab Test 23  1132      POTASSIUM 4.6   CHLORIDE 104   CO2 25   *   BUN 15.9   CR 0.76   GFRESTIMATED 90   ISAIAS 9.6     Recent Labs   Lab Test 23  1132 23  1252 12/15/21  1622   CR 0.76 0.65 0.66     No results for input(s): \"A1C\" in the last  " "hours.       No results for input(s): \"WBC\", \"HGB\", \"HCT\", \"MCV\", \"PLT\" in the last 42179 hours.  No results for input(s): \"HGB\" in the last 81785 hours. No results for input(s): \"TROPONINI\" in the last 06553 hours.  No results for input(s): \"BNP\", \"NTBNPI\", \"NTBNP\" in the last 48731 hours.  Recent Labs   Lab Test 11/27/23  1316   TSH 1.18     No results for input(s): \"INR\" in the last 30111 hours.     Dwain Ramos MD                                        Thank you for allowing me to participate in the care of your patient.      Sincerely,     Dwain Ramos MD     [facility]  cc:   Kohi Ware MD  [address]  [place] [address]      "

## 2024-01-04 ENCOUNTER — TELEPHONE (OUTPATIENT)
Dept: CARDIOLOGY | Facility: CLINIC | Age: 59
End: 2024-01-04
Payer: COMMERCIAL

## 2024-01-04 NOTE — TELEPHONE ENCOUNTER
Pt having upcoming surgery tomorrow for breast cancer.  [facility] called and requested a copy of Dr. Ramos's office visit be sent over for them to review prior to surgery.    Fax sent to [phone] Attn: Trell.    No further questions or concerns at this time.    Mary

## 2024-01-09 LAB
ATRIAL RATE - MUSE: 55 BPM
DIASTOLIC BLOOD PRESSURE - MUSE: NORMAL MMHG
INTERPRETATION ECG - MUSE: NORMAL
P AXIS - MUSE: 23 DEGREES
PR INTERVAL - MUSE: 162 MS
QRS DURATION - MUSE: 98 MS
QT - MUSE: 456 MS
QTC - MUSE: 436 MS
R AXIS - MUSE: 38 DEGREES
SYSTOLIC BLOOD PRESSURE - MUSE: NORMAL MMHG
T AXIS - MUSE: 20 DEGREES
VENTRICULAR RATE- MUSE: 55 BPM

## 2024-02-06 ENCOUNTER — TRANSFERRED RECORDS (OUTPATIENT)
Dept: HEALTH INFORMATION MANAGEMENT | Facility: CLINIC | Age: 59
End: 2024-02-06
Payer: COMMERCIAL

## 2024-02-12 ENCOUNTER — ANESTHESIA EVENT (OUTPATIENT)
Dept: SURGERY | Facility: CLINIC | Age: 59
End: 2024-02-12
Payer: COMMERCIAL

## 2024-02-12 ENCOUNTER — ANESTHESIA (OUTPATIENT)
Dept: SURGERY | Facility: CLINIC | Age: 59
End: 2024-02-12
Payer: COMMERCIAL

## 2024-02-12 ENCOUNTER — HOSPITAL ENCOUNTER (OUTPATIENT)
Facility: CLINIC | Age: 59
Discharge: HOME | End: 2024-02-12
Attending: INTERNAL MEDICINE | Admitting: INTERNAL MEDICINE
Payer: COMMERCIAL

## 2024-02-12 VITALS
DIASTOLIC BLOOD PRESSURE: 56 MMHG | TEMPERATURE: 97.7 F | RESPIRATION RATE: 18 BRPM | HEART RATE: 84 BPM | SYSTOLIC BLOOD PRESSURE: 110 MMHG | OXYGEN SATURATION: 97 %

## 2024-02-12 LAB — UPPER GI ENDOSCOPY: NORMAL

## 2024-02-12 PROCEDURE — 88342 IMHCHEM/IMCYTCHM 1ST ANTB: CPT | Mod: TC | Performed by: INTERNAL MEDICINE

## 2024-02-12 PROCEDURE — 360N000075 HC SURGERY LEVEL 2, PER MIN: Performed by: INTERNAL MEDICINE

## 2024-02-12 PROCEDURE — 272N000001 HC OR GENERAL SUPPLY STERILE: Performed by: INTERNAL MEDICINE

## 2024-02-12 PROCEDURE — 710N000012 HC RECOVERY PHASE 2, PER MINUTE: Performed by: INTERNAL MEDICINE

## 2024-02-12 PROCEDURE — 250N000009 HC RX 250: Performed by: NURSE ANESTHETIST, CERTIFIED REGISTERED

## 2024-02-12 PROCEDURE — 88342 IMHCHEM/IMCYTCHM 1ST ANTB: CPT | Mod: 26 | Performed by: PATHOLOGY

## 2024-02-12 PROCEDURE — 370N000017 HC ANESTHESIA TECHNICAL FEE, PER MIN: Performed by: INTERNAL MEDICINE

## 2024-02-12 PROCEDURE — 258N000003 HC RX IP 258 OP 636: Performed by: ANESTHESIOLOGY

## 2024-02-12 PROCEDURE — 88305 TISSUE EXAM BY PATHOLOGIST: CPT | Mod: 26 | Performed by: PATHOLOGY

## 2024-02-12 PROCEDURE — 999N000141 HC STATISTIC PRE-PROCEDURE NURSING ASSESSMENT: Performed by: INTERNAL MEDICINE

## 2024-02-12 PROCEDURE — 250N000011 HC RX IP 250 OP 636: Performed by: NURSE ANESTHETIST, CERTIFIED REGISTERED

## 2024-02-12 RX ORDER — HYDROMORPHONE HCL IN WATER/PF 6 MG/30 ML
0.2 PATIENT CONTROLLED ANALGESIA SYRINGE INTRAVENOUS EVERY 5 MIN PRN
Status: DISCONTINUED | OUTPATIENT
Start: 2024-02-12 | End: 2024-02-12 | Stop reason: HOSPADM

## 2024-02-12 RX ORDER — OXYCODONE HYDROCHLORIDE 5 MG/1
10 TABLET ORAL
Status: DISCONTINUED | OUTPATIENT
Start: 2024-02-12 | End: 2024-02-12 | Stop reason: HOSPADM

## 2024-02-12 RX ORDER — HALOPERIDOL 5 MG/ML
1 INJECTION INTRAMUSCULAR
Status: DISCONTINUED | OUTPATIENT
Start: 2024-02-12 | End: 2024-02-12 | Stop reason: HOSPADM

## 2024-02-12 RX ORDER — FENTANYL CITRATE 50 UG/ML
50 INJECTION, SOLUTION INTRAMUSCULAR; INTRAVENOUS EVERY 5 MIN PRN
Status: DISCONTINUED | OUTPATIENT
Start: 2024-02-12 | End: 2024-02-12 | Stop reason: HOSPADM

## 2024-02-12 RX ORDER — MEPERIDINE HYDROCHLORIDE 25 MG/ML
12.5 INJECTION INTRAMUSCULAR; INTRAVENOUS; SUBCUTANEOUS EVERY 5 MIN PRN
Status: DISCONTINUED | OUTPATIENT
Start: 2024-02-12 | End: 2024-02-12 | Stop reason: HOSPADM

## 2024-02-12 RX ORDER — OXYCODONE HYDROCHLORIDE 5 MG/1
5 TABLET ORAL
Status: DISCONTINUED | OUTPATIENT
Start: 2024-02-12 | End: 2024-02-12 | Stop reason: HOSPADM

## 2024-02-12 RX ORDER — PROPOFOL 10 MG/ML
INJECTION, EMULSION INTRAVENOUS CONTINUOUS PRN
Status: DISCONTINUED | OUTPATIENT
Start: 2024-02-12 | End: 2024-02-12

## 2024-02-12 RX ORDER — FENTANYL CITRATE 50 UG/ML
25 INJECTION, SOLUTION INTRAMUSCULAR; INTRAVENOUS
Status: DISCONTINUED | OUTPATIENT
Start: 2024-02-12 | End: 2024-02-12 | Stop reason: HOSPADM

## 2024-02-12 RX ORDER — HYDROMORPHONE HCL IN WATER/PF 6 MG/30 ML
0.4 PATIENT CONTROLLED ANALGESIA SYRINGE INTRAVENOUS EVERY 5 MIN PRN
Status: DISCONTINUED | OUTPATIENT
Start: 2024-02-12 | End: 2024-02-12 | Stop reason: HOSPADM

## 2024-02-12 RX ORDER — ONDANSETRON 2 MG/ML
4 INJECTION INTRAMUSCULAR; INTRAVENOUS EVERY 30 MIN PRN
Status: DISCONTINUED | OUTPATIENT
Start: 2024-02-12 | End: 2024-02-12 | Stop reason: HOSPADM

## 2024-02-12 RX ORDER — ONDANSETRON 4 MG/1
4 TABLET, ORALLY DISINTEGRATING ORAL EVERY 30 MIN PRN
Status: DISCONTINUED | OUTPATIENT
Start: 2024-02-12 | End: 2024-02-12 | Stop reason: HOSPADM

## 2024-02-12 RX ORDER — SODIUM CHLORIDE, SODIUM LACTATE, POTASSIUM CHLORIDE, CALCIUM CHLORIDE 600; 310; 30; 20 MG/100ML; MG/100ML; MG/100ML; MG/100ML
INJECTION, SOLUTION INTRAVENOUS CONTINUOUS
Status: DISCONTINUED | OUTPATIENT
Start: 2024-02-12 | End: 2024-02-12 | Stop reason: HOSPADM

## 2024-02-12 RX ORDER — LIDOCAINE 40 MG/G
CREAM TOPICAL
Status: DISCONTINUED | OUTPATIENT
Start: 2024-02-12 | End: 2024-02-12 | Stop reason: HOSPADM

## 2024-02-12 RX ORDER — LIDOCAINE HYDROCHLORIDE 10 MG/ML
INJECTION, SOLUTION INFILTRATION; PERINEURAL PRN
Status: DISCONTINUED | OUTPATIENT
Start: 2024-02-12 | End: 2024-02-12

## 2024-02-12 RX ORDER — FENTANYL CITRATE 50 UG/ML
25 INJECTION, SOLUTION INTRAMUSCULAR; INTRAVENOUS EVERY 5 MIN PRN
Status: DISCONTINUED | OUTPATIENT
Start: 2024-02-12 | End: 2024-02-12 | Stop reason: HOSPADM

## 2024-02-12 RX ADMIN — PROPOFOL 200 MCG/KG/MIN: 10 INJECTION, EMULSION INTRAVENOUS at 10:36

## 2024-02-12 RX ADMIN — LIDOCAINE HYDROCHLORIDE 5 ML: 10 INJECTION, SOLUTION INFILTRATION; PERINEURAL at 10:36

## 2024-02-12 RX ADMIN — SODIUM CHLORIDE, POTASSIUM CHLORIDE, SODIUM LACTATE AND CALCIUM CHLORIDE: 600; 310; 30; 20 INJECTION, SOLUTION INTRAVENOUS at 10:35

## 2024-02-12 ASSESSMENT — ENCOUNTER SYMPTOMS: DYSRHYTHMIAS: 1

## 2024-02-12 ASSESSMENT — ACTIVITIES OF DAILY LIVING (ADL): ADLS_ACUITY_SCORE: 35

## 2024-02-12 NOTE — ANESTHESIA POSTPROCEDURE EVALUATION
Patient: Justine Viera    Procedure: Procedure(s):  ESOPHAGOGASTRODUODENOSCOPY with biopsies       Anesthesia Type:  MAC    Note:  Disposition: Outpatient   Postop Pain Control: Uneventful            Sign Out: Well controlled pain   PONV: No   Neuro/Psych: Uneventful            Sign Out: Acceptable/Baseline neuro status   Airway/Respiratory: Uneventful            Sign Out: Acceptable/Baseline resp. status   CV/Hemodynamics: Uneventful            Sign Out: Acceptable CV status; No obvious hypovolemia; No obvious fluid overload   Other NRE: NONE   DID A NON-ROUTINE EVENT OCCUR? No           Last vitals:  Vitals Value Taken Time   /56 02/12/24 1110   Temp 36.1  C (97  F) 02/12/24 1050   Pulse 64 02/12/24 1111   Resp 16 02/12/24 1050   SpO2 97 % 02/12/24 1111   Vitals shown include unfiled device data.    Electronically Signed By: Stevie Avalos MD  February 12, 2024  11:13 AM

## 2024-02-12 NOTE — ANESTHESIA PREPROCEDURE EVALUATION
Anesthesia Pre-Procedure Evaluation    Patient: Justine Viera   MRN: [id number] : 1965        Procedure : Procedure(s):  ESOPHAGOGASTRODUODENOSCOPY          Past Medical History:   Diagnosis Date    Endometrial cancer (H)     SVT (supraventricular tachycardia)       Past Surgical History:   Procedure Laterality Date    AS INSERT VAGINAL RADIATION DEVICE      Vaginal brachytherapy    BREAST SURGERY Bilateral     Double Masectomy-BP/IV on L side only please    LAPAROSCOPIC HYSTERECTOMY TOTAL, BILATERAL SALPINGO-OOPHORECTOMY, NODE DISSECTION, COMBINED  2021    Robot-assisted laparoscopic hysterectomy, bilateral salpingo-oophorectomy, sentinel lymph nodes    SKIN CANCER EXCISION        Allergies   Allergen Reactions    Caffeine      Other reaction(s): Other (see comments)    Erythromycin Base [Erythromycin] Unknown    Fish Oil      Other reaction(s): Arrhythmia, Palpitations  Large quantities of it (several days)-also has this with Lanolin based Vitamin D    Magnesium Sulfate Unknown    Other Allergy (See Comments) [External Allergen Needs Reconciliation - See Comment] Other (See Comments)     Magnesium sulfate (given during  labor.)    Terbutaline Other (See Comments)      Social History     Tobacco Use    Smoking status: Never    Smokeless tobacco: Never   Substance Use Topics    Alcohol use: Never      Wt Readings from Last 1 Encounters:   24 57.2 kg (126 lb)        Anesthesia Evaluation   Pt has had prior anesthetic.     No history of anesthetic complications       ROS/MED HX  ENT/Pulmonary:  - neg pulmonary ROS     Neurologic:  - neg neurologic ROS     Cardiovascular: Comment: Recent Zio patch monitor showed a PVC burden of 12.5%  Approximately 7 days of monitoring  Heart rate ranged from 49 to 171 bpm     Recent Echocardiogram Results     Interpretation Summary     The left ventricle is normal in size.  There is normal left ventricular wall thickness.  The visual ejection fraction is  "45-50%.  There is mild global hypokinesia of the left ventricle.  Normal right ventricle size and systolic function.  Normal left atrial size.  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  There is mild (1+) mitral regurgitation.     No previous study for comparison.     Recent Stress test Results         Lexiscan stress ECG negative for ischemia.     The nuclear stress test is negative for inducible myocardial ischemia or infarction.     The left ventricular ejection fraction at stress is 66%.     There is no prior study for comparison.      - neg cardiovascular ROS   (+)  - -   -  - -      CHF  Last EF: 45                dysrhythmias (SVT),              METS/Exercise Tolerance: >4 METS    Hematologic:  - neg hematologic  ROS     Musculoskeletal:  - neg musculoskeletal ROS     GI/Hepatic:  - neg GI/hepatic ROS     Renal/Genitourinary:  - neg Renal ROS     Endo:  - neg endo ROS     Psychiatric/Substance Use:  - neg psychiatric ROS     Infectious Disease:  - neg infectious disease ROS     Malignancy:  - neg malignancy ROS (+) Malignancy, History of Breast and Other.Breast CA status post Surgery.  Other CA endometrial status post Surgery.    Other:  - neg other ROS          Physical Exam    Airway        Mallampati: II   TM distance: > 3 FB   Neck ROM: full   Mouth opening: > 3 cm    Respiratory Devices and Support         Dental       (+) Minor Abnormalities - some fillings, tiny chips    B=Bridge, C=Chipped, L=Loose, M=Missing    Cardiovascular   cardiovascular exam normal          Pulmonary   pulmonary exam normal                OUTSIDE LABS:  CBC: No results found for: \"WBC\", \"HGB\", \"HCT\", \"PLT\"  BMP:   Lab Results   Component Value Date     12/20/2023     09/27/2023    POTASSIUM 4.6 12/20/2023    POTASSIUM 4.6 09/27/2023    CHLORIDE 104 12/20/2023    CHLORIDE 105 09/27/2023    CO2 25 12/20/2023    CO2 26 09/27/2023    BUN 15.9 12/20/2023    BUN 13.1 09/27/2023    CR 0.76 12/20/2023    CR 0.65 09/27/2023    GLC " "102 (H) 12/20/2023    GLC 93 09/27/2023     COAGS: No results found for: \"PTT\", \"INR\", \"FIBR\"  POC: No results found for: \"BGM\", \"HCG\", \"HCGS\"  HEPATIC: No results found for: \"ALBUMIN\", \"PROTTOTAL\", \"ALT\", \"AST\", \"GGT\", \"ALKPHOS\", \"BILITOTAL\", \"BILIDIRECT\", \"LAMONTE\"  OTHER:   Lab Results   Component Value Date    ISAIAS 9.6 12/20/2023    TSH 1.18 11/27/2023       Anesthesia Plan    ASA Status:  3    NPO Status:  NPO Appropriate    Anesthesia Type: MAC.     - Reason for MAC: immobility needed, straight local not clinically adequate   Induction: Propofol.   Maintenance: TIVA.        Consents    Anesthesia Plan(s) and associated risks, benefits, and realistic alternatives discussed. Questions answered and patient/representative(s) expressed understanding.     - Discussed:     - Discussed with:  Patient            Postoperative Care    Pain management: Multi-modal analgesia.   PONV prophylaxis: Ondansetron (or other 5HT-3), Dexamethasone or Solumedrol     Comments:    Other Comments: propofol    Reviewed anesthetic options and risks. Patient agrees to proceed.            Stevie Avalos MD    I have reviewed the pertinent notes and labs in the chart from the past 30 days and (re)examined the patient.  Any updates or changes from those notes are reflected in this note.                  "

## 2024-02-13 LAB
PATH REPORT.COMMENTS IMP SPEC: NORMAL
PATH REPORT.COMMENTS IMP SPEC: NORMAL
PATH REPORT.FINAL DX SPEC: NORMAL
PATH REPORT.GROSS SPEC: NORMAL
PATH REPORT.MICROSCOPIC SPEC OTHER STN: NORMAL
PATH REPORT.RELEVANT HX SPEC: NORMAL
PHOTO IMAGE: NORMAL

## 2024-03-08 ENCOUNTER — OFFICE VISIT (OUTPATIENT)
Dept: CARDIOLOGY | Facility: CLINIC | Age: 59
End: 2024-03-08
Attending: INTERNAL MEDICINE
Payer: COMMERCIAL

## 2024-03-08 VITALS
BODY MASS INDEX: 21.68 KG/M2 | HEART RATE: 44 BPM | SYSTOLIC BLOOD PRESSURE: 112 MMHG | HEIGHT: 64 IN | RESPIRATION RATE: 16 BRPM | DIASTOLIC BLOOD PRESSURE: 68 MMHG | WEIGHT: 127 LBS

## 2024-03-08 DIAGNOSIS — I49.3 PVC'S (PREMATURE VENTRICULAR CONTRACTIONS): ICD-10-CM

## 2024-03-08 DIAGNOSIS — R53.83 FATIGUE, UNSPECIFIED TYPE: ICD-10-CM

## 2024-03-08 DIAGNOSIS — I42.8 NONISCHEMIC CARDIOMYOPATHY (H): Primary | ICD-10-CM

## 2024-03-08 PROCEDURE — 99214 OFFICE O/P EST MOD 30 MIN: CPT | Performed by: INTERNAL MEDICINE

## 2024-03-08 RX ORDER — CARVEDILOL 3.12 MG/1
3.12 TABLET ORAL 2 TIMES DAILY WITH MEALS
Qty: 180 TABLET | Refills: 11 | Status: SHIPPED | OUTPATIENT
Start: 2024-03-08

## 2024-03-08 RX ORDER — MULTIPLE VITAMINS W/ MINERALS TAB 9MG-400MCG
1 TAB ORAL DAILY
COMMUNITY

## 2024-03-08 RX ORDER — ANASTROZOLE 1 MG/1
1 TABLET ORAL
COMMUNITY
Start: 2024-02-13

## 2024-03-08 RX ORDER — IBUPROFEN 200 MG
1 CAPSULE ORAL 2 TIMES DAILY
COMMUNITY

## 2024-03-08 NOTE — LETTER
"3/8/2024    Bruna Torrez MD  [address] [facility] [address]    RE: Justine Viera       Dear Colleague,     I had the pleasure of seeing Justine Viera in the [facility].    Thank you, Dr. Fam, for asking the [facility] team to see Ms. Justine Viera to evaluate       Assessment/Recommendations   Assessment/Plan:  Possible stress CM - repeat echo and if EF normal, would change meotprolol to carvedilol and hold on MRI/holter  MR - await echo  PVC - track symptoms, repeat holter and obtain MRI heart IF EF still low and follow up with EP re ablation    Follow up one year     History of Present Illness/Subjective    Ms. Justine Viera is a 58 year old female with CM nonischemic 45-50%, P VC/CVT, on metoprolol, s/p double mastectomy due to breast cancer, lymphnodes neg, biopsy result day before thanksgiving, and 11/28 had new CM.  No ches tpain/pressure, syncpal events, PND/orthopnea, edema.  No CV compliatinos from surgery in January.           Physical Examination Review of Systems   /68 (BP Location: Left arm, Patient Position: Sitting, Cuff Size: Adult Regular)   Pulse (!) 44   Resp 16   Ht 1.619 m (5' 3.75\")   Wt 57.6 kg (127 lb)   BMI 21.97 kg/m    Body mass index is 21.97 kg/m .  Wt Readings from Last 3 Encounters:   03/08/24 57.6 kg (127 lb)   01/03/24 57.2 kg (126 lb)   12/08/23 57.9 kg (127 lb 11.2 oz)     [unfilled]  General Appearance:   no distress, normal body habitus   ENT/Mouth: membranes moist, no oral lesions or bleeding gums.      EYES:  no scleral icterus, normal conjunctivae   Neck: no carotid bruits or thyromegaly   Chest/Lungs:   lungs are clear to auscultation, no rales or wheezing,  sternal scar, equal chest wall expansion    Cardiovascular:   Regular. Normal first and second heart sounds with no murmurs, rubs, or gallops; the carotid, radial and posterior tibial pulses are intact, Jugular venous pressure , edema bilaterally "    Abdomen:  no organomegaly, masses, bruits, or tenderness; bowel sounds are present   Extremities: no cyanosis or clubbing   Skin: no xanthelasma, warm.    Neurologic: normal  bilateral, no tremors     Psychiatric: alert and oriented x3, calm     Review of Systems - 12 points nega other than above      Medical History  Surgical History Family History Social History   Past Medical History:   Diagnosis Date    Endometrial cancer (H)     SVT (supraventricular tachycardia)     Past Surgical History:   Procedure Laterality Date    AS INSERT VAGINAL RADIATION DEVICE      Vaginal brachytherapy    BREAST SURGERY Bilateral     Double Masectomy-BP/IV on L side only please    ESOPHAGOSCOPY, GASTROSCOPY, DUODENOSCOPY (EGD), COMBINED N/A 2/12/2024    Procedure: ESOPHAGOGASTRODUODENOSCOPY with biopsies;  Surgeon: Hiram Burnette MD;  Location: [facility] Main OR    LAPAROSCOPIC HYSTERECTOMY TOTAL, BILATERAL SALPINGO-OOPHORECTOMY, NODE DISSECTION, COMBINED  12/20/2021    Robot-assisted laparoscopic hysterectomy, bilateral salpingo-oophorectomy, sentinel lymph nodes    SKIN CANCER EXCISION      Family History   Problem Relation Age of Onset    Acute Myocardial Infarction Father 47    Prostate Cancer Father     Prostate Cancer Paternal Uncle     Colon Cancer No family hx of         no overy, uterus, breast    Social History     Socioeconomic History    Marital status:      Spouse name: Not on file    Number of children: Not on file    Years of education: Not on file    Highest education level: Not on file   Occupational History    Not on file   Tobacco Use    Smoking status: Never    Smokeless tobacco: Never   Vaping Use    Vaping Use: Never used   Substance and Sexual Activity    Alcohol use: Never    Drug use: Never    Sexual activity: Not on file   Other Topics Concern    Not on file   Social History Narrative    Not on file     Social Determinants of Health     Financial Resource Strain: Not on file   Food  "Insecurity: Not on file   Transportation Needs: Not on file   Physical Activity: Not on file   Stress: Not on file   Social Connections: Not on file   Interpersonal Safety: Not At Risk (2022)    Humiliation, Afraid, Rape, and Kick questionnaire     Fear of Current or Ex-Partner: No     Emotionally Abused: No     Physically Abused: No     Sexually Abused: No   Housing Stability: Not on file          Medications  Allergies   Scheduled Meds:  Continuous Infusions:  PRN Meds:. Allergies   Allergen Reactions    Caffeine      Other reaction(s): Other (see comments)    Erythromycin Base [Erythromycin] Unknown    Fish Oil      Other reaction(s): Arrhythmia, Palpitations  Large quantities of it (several days)-also has this with Lanolin based Vitamin D    Magnesium Sulfate Unknown    Other Allergy (See Comments) [External Allergen Needs Reconciliation - See Comment] Other (See Comments)     Magnesium sulfate (given during  labor.)    Terbutaline Other (See Comments)         Lab Results    Chemistry/lipid CBC Cardiac Enzymes/BNP/TSH/INR   Lab Results   Component Value Date    CHOL 198 2023    HDL 76 2023    TRIG 74 2023    BUN 15.9 2023     2023    CO2 25 2023    No results found for: \"WBC\", \"HGB\", \"HCT\", \"MCV\", \"PLT\" Lab Results   Component Value Date    TSH 1.18 2023              Louis Fam MD  Interventional Cardiology  [facility]      Thank you for allowing me to participate in the care of your patient.      Sincerely,     Louis Fam MD     [facility]  cc:   Louis Fam MD  [address]  [place] [address]      "

## 2024-03-08 NOTE — PROGRESS NOTES
"  Thank you, Dr. Fam, for asking the [facility] team to see Ms. Justine Viera to evaluate       Assessment/Recommendations   Assessment/Plan:  Possible stress CM - repeat echo and if EF normal, would change meotprolol to carvedilol and hold on MRI/holter  MR - await echo  PVC - track symptoms, repeat holter and obtain MRI heart IF EF still low and follow up with EP re ablation    Follow up one year     History of Present Illness/Subjective    Ms. Justine Viera is a 58 year old female with CM nonischemic 45-50%, P VC/CVT, on metoprolol, s/p double mastectomy due to breast cancer, lymphnodes neg, biopsy result day before thanksgiving, and 11/28 had new CM.  No ches tpain/pressure, syncpal events, PND/orthopnea, edema.  No CV compliatinos from surgery in January.           Physical Examination Review of Systems   /68 (BP Location: Left arm, Patient Position: Sitting, Cuff Size: Adult Regular)   Pulse (!) 44   Resp 16   Ht 1.619 m (5' 3.75\")   Wt 57.6 kg (127 lb)   BMI 21.97 kg/m    Body mass index is 21.97 kg/m .  Wt Readings from Last 3 Encounters:   03/08/24 57.6 kg (127 lb)   01/03/24 57.2 kg (126 lb)   12/08/23 57.9 kg (127 lb 11.2 oz)     [unfilled]  General Appearance:   no distress, normal body habitus   ENT/Mouth: membranes moist, no oral lesions or bleeding gums.      EYES:  no scleral icterus, normal conjunctivae   Neck: no carotid bruits or thyromegaly   Chest/Lungs:   lungs are clear to auscultation, no rales or wheezing,  sternal scar, equal chest wall expansion    Cardiovascular:   Regular. Normal first and second heart sounds with no murmurs, rubs, or gallops; the carotid, radial and posterior tibial pulses are intact, Jugular venous pressure , edema bilaterally    Abdomen:  no organomegaly, masses, bruits, or tenderness; bowel sounds are present   Extremities: no cyanosis or clubbing   Skin: no xanthelasma, warm.    Neurologic: normal  bilateral, no tremors   "   Psychiatric: alert and oriented x3, calm     Review of Systems - 12 points nega other than above      Medical History  Surgical History Family History Social History   Past Medical History:   Diagnosis Date    Endometrial cancer (H)     SVT (supraventricular tachycardia)     Past Surgical History:   Procedure Laterality Date    AS INSERT VAGINAL RADIATION DEVICE      Vaginal brachytherapy    BREAST SURGERY Bilateral     Double Masectomy-BP/IV on L side only please    ESOPHAGOSCOPY, GASTROSCOPY, DUODENOSCOPY (EGD), COMBINED N/A 2/12/2024    Procedure: ESOPHAGOGASTRODUODENOSCOPY with biopsies;  Surgeon: Hiram Burnette MD;  Location: [facility] Main OR    LAPAROSCOPIC HYSTERECTOMY TOTAL, BILATERAL SALPINGO-OOPHORECTOMY, NODE DISSECTION, COMBINED  12/20/2021    Robot-assisted laparoscopic hysterectomy, bilateral salpingo-oophorectomy, sentinel lymph nodes    SKIN CANCER EXCISION      Family History   Problem Relation Age of Onset    Acute Myocardial Infarction Father 47    Prostate Cancer Father     Prostate Cancer Paternal Uncle     Colon Cancer No family hx of         no overy, uterus, breast    Social History     Socioeconomic History    Marital status:      Spouse name: Not on file    Number of children: Not on file    Years of education: Not on file    Highest education level: Not on file   Occupational History    Not on file   Tobacco Use    Smoking status: Never    Smokeless tobacco: Never   Vaping Use    Vaping Use: Never used   Substance and Sexual Activity    Alcohol use: Never    Drug use: Never    Sexual activity: Not on file   Other Topics Concern    Not on file   Social History Narrative    Not on file     Social Determinants of Health     Financial Resource Strain: Not on file   Food Insecurity: Not on file   Transportation Needs: Not on file   Physical Activity: Not on file   Stress: Not on file   Social Connections: Not on file   Interpersonal Safety: Not At Risk (9/16/2022)     "Humiliation, Afraid, Rape, and Kick questionnaire     Fear of Current or Ex-Partner: No     Emotionally Abused: No     Physically Abused: No     Sexually Abused: No   Housing Stability: Not on file          Medications  Allergies   Scheduled Meds:  Continuous Infusions:  PRN Meds:. Allergies   Allergen Reactions    Caffeine      Other reaction(s): Other (see comments)    Erythromycin Base [Erythromycin] Unknown    Fish Oil      Other reaction(s): Arrhythmia, Palpitations  Large quantities of it (several days)-also has this with Lanolin based Vitamin D    Magnesium Sulfate Unknown    Other Allergy (See Comments) [External Allergen Needs Reconciliation - See Comment] Other (See Comments)     Magnesium sulfate (given during  labor.)    Terbutaline Other (See Comments)         Lab Results    Chemistry/lipid CBC Cardiac Enzymes/BNP/TSH/INR   Lab Results   Component Value Date    CHOL 198 2023    HDL 76 2023    TRIG 74 2023    BUN 15.9 2023     2023    CO2 25 2023    No results found for: \"WBC\", \"HGB\", \"HCT\", \"MCV\", \"PLT\" Lab Results   Component Value Date    TSH 1.18 2023              Louis Fam MD  Interventional Cardiology  [facility]           "

## 2024-03-08 NOTE — PATIENT INSTRUCTIONS
Possible stress cardiomyopathy as culprit of lower heart function    Obtain echocardiogram and if ejection fraction normal would hold on MRI of the heart    If heart function is normal on the echocardiogram then change metoprolol to carvedilol twice a day

## 2024-03-22 ENCOUNTER — HOSPITAL ENCOUNTER (OUTPATIENT)
Dept: CARDIOLOGY | Facility: HOSPITAL | Age: 59
Discharge: HOME | End: 2024-03-22
Attending: INTERNAL MEDICINE | Admitting: INTERNAL MEDICINE
Payer: COMMERCIAL

## 2024-03-22 DIAGNOSIS — I42.8 NONISCHEMIC CARDIOMYOPATHY (H): ICD-10-CM

## 2024-03-22 DIAGNOSIS — I49.3 PVC'S (PREMATURE VENTRICULAR CONTRACTIONS): ICD-10-CM

## 2024-03-22 LAB — LVEF ECHO: NORMAL

## 2024-03-22 PROCEDURE — 93306 TTE W/DOPPLER COMPLETE: CPT | Mod: 26 | Performed by: STUDENT IN AN ORGANIZED HEALTH CARE EDUCATION/TRAINING PROGRAM

## 2024-03-22 PROCEDURE — 93306 TTE W/DOPPLER COMPLETE: CPT

## 2024-11-07 ENCOUNTER — LAB REQUISITION (OUTPATIENT)
Dept: LAB | Facility: CLINIC | Age: 59
End: 2024-11-07

## 2024-11-07 ENCOUNTER — TRANSFERRED RECORDS (OUTPATIENT)
Dept: HEALTH INFORMATION MANAGEMENT | Facility: CLINIC | Age: 59
End: 2024-11-07

## 2024-11-07 DIAGNOSIS — Z90.13 ACQUIRED ABSENCE OF BILATERAL BREASTS AND NIPPLES: ICD-10-CM

## 2024-11-07 DIAGNOSIS — Z13.220 ENCOUNTER FOR SCREENING FOR LIPOID DISORDERS: ICD-10-CM

## 2024-11-07 LAB
CHOLEST SERPL-MCNC: 196 MG/DL
FASTING STATUS PATIENT QL REPORTED: ABNORMAL
HDLC SERPL-MCNC: 66 MG/DL
LDLC SERPL CALC-MCNC: 107 MG/DL
NONHDLC SERPL-MCNC: 130 MG/DL
TRIGL SERPL-MCNC: 113 MG/DL
VIT D+METAB SERPL-MCNC: 36 NG/ML (ref 20–50)

## 2024-11-07 PROCEDURE — 82306 VITAMIN D 25 HYDROXY: CPT | Performed by: FAMILY MEDICINE

## 2024-11-07 PROCEDURE — 82465 ASSAY BLD/SERUM CHOLESTEROL: CPT | Performed by: FAMILY MEDICINE

## 2024-11-17 ENCOUNTER — HEALTH MAINTENANCE LETTER (OUTPATIENT)
Age: 59
End: 2024-11-17

## 2025-03-27 ENCOUNTER — OFFICE VISIT (OUTPATIENT)
Dept: CARDIOLOGY | Facility: CLINIC | Age: 60
End: 2025-03-27
Payer: COMMERCIAL

## 2025-03-27 VITALS
SYSTOLIC BLOOD PRESSURE: 124 MMHG | HEART RATE: 72 BPM | RESPIRATION RATE: 16 BRPM | DIASTOLIC BLOOD PRESSURE: 82 MMHG | OXYGEN SATURATION: 97 % | BODY MASS INDEX: 23.18 KG/M2 | WEIGHT: 134 LBS

## 2025-03-27 DIAGNOSIS — I34.1 MITRAL VALVE POSTERIOR LEAFLET PROLAPSE: ICD-10-CM

## 2025-03-27 DIAGNOSIS — I42.8 NONISCHEMIC CARDIOMYOPATHY (H): Primary | ICD-10-CM

## 2025-03-27 DIAGNOSIS — I49.3 PVC'S (PREMATURE VENTRICULAR CONTRACTIONS): ICD-10-CM

## 2025-03-27 NOTE — PROGRESS NOTES
HEART CARE ENCOUNTER CONSULTATON NOTE      [facility]  [phone]      Assessment/Recommendations   Assessment:   Non-Ischemic cardiomyopathy, possible stress induced with recovered ejection fraction.: TTE 3/2024 LVEF 60-65%, improved from previously 45-50% 11/2023 TTE, no ischemia on nuclear lexiscan stress test at that time.  Compensated.  PVCs: Carvedilol 3.125 mg twice daily discontinued, 15.8% burden per Holter monitor 10/2020.  Fluttering/palpitations today not correlated on Kardia mobile or exam with arrhythmia. Reportedly may be stress induced with healthcare visit.  Mitral valve prolapse: borderline mitral valve prolapse. There is mild (1+) mitral regurgitation 3/2024      Plan:   She appears well compensated on exam, no PVCs after extended auscultation.  Continue monitoring for increased fatigue, palpitations, heart failure symptoms and with Kardia mobile periodically.  Will keep carvedilol on medication list for her to use as needed with sensation of palpitations patient preference, appropriate given known tolerance of medication.      Follow up in 1 year or sooner as needed     History of Present Illness/Subjective    HPI: Justine Viera is a pleasant 59 year old female with PMHx of ischemic cardiomyopathy with recovered ejection fraction presents for follow up.    Is accompanied by her  Larry today.  Approximately 1 year ago she underwent surgery for breast cancer.  Around this time echocardiogram suggested mildly reduced ejection fraction 45 to 50%.  She was started on metoprolol eventually transition to carvedilol.  Documented history of current PVCs in 2020 Holter monitor, symptomatic with palpitations.  Repeat echo showed normalized LVEF 60-65%.     Patient reports a few medication adjustments in the last several months.  October/November 2024 she increased her vitamin D dose.  In December she was given okay by Dr. Fam to stop low-dose carvedilol.  Lately she  actually felt better by way of palpitations.  Recently she decided to discontinue all of her vitamin D/calcium supplements.  In the last 4 to 5 days she has felt more fluttering of the heart, some PVCs.  He purchased Kardia mobile which has intermittently shown normal sinus rhythm, PVCs associated with palpitation symptoms.  She admits she is feeling nervous to come into the office and see provider today.  She was surprised given her symptoms that Kardia mobile showed normal sinus rhythm without a single PVC today.  She wonders if she could take carvedilol as needed during times of stress with suspected increase palpitation.    She denies lightheadedness, shortness of breath, dyspnea on exertion, orthopnea, PND, chest pain, and lower extremity edema.      Echocardiogram 3/22/2024 Results:  The left ventricle is normal in size. There is normal left ventricular wall  thickness.  Left ventricular systolic function is normal. The visual ejection fraction is  60-65%. No regional wall motion abnormalities noted.     The right ventricle is normal in size and function.  The left atrium is mildly dilated. Right atrial size is normal.  There is borderline mitral valve prolapse. There is mild (1+) mitral  regurgitation.  Right ventricle systolic pressure estimate normal  IVC diameter and respiratory changes fall into an intermediate range  suggesting an RA pressure of 8 mmHg.     When compared with previous study from 11/28/2023 the LV systolic function has  improved.     Physical Examination  Review of Systems   Vitals: /82 (BP Location: Left arm, Patient Position: Sitting, Cuff Size: Adult Regular)   Pulse 72   Resp 16   Wt 60.8 kg (134 lb)   SpO2 97%   BMI 23.18 kg/m    BMI= Body mass index is 23.18 kg/m .  Wt Readings from Last 3 Encounters:   03/27/25 60.8 kg (134 lb)   03/08/24 57.6 kg (127 lb)   01/03/24 57.2 kg (126 lb)           ENT/Mouth: membranes moist, no oral lesions or bleeding gums.      EYES:  no  scleral icterus, normal conjunctivae       Chest/Lungs:   lungs are clear to auscultation, no rales or wheezing, equal chest wall expansion    Cardiovascular:   Regular. Normal first and second heart sounds with no murmurs, rubs, or gallops; the  radial and posterior tibial pulses are intact, Jugular venous pressure normal, absent edema bilaterally        Extremities: no cyanosis or clubbing   Skin: no xanthelasma, warm.    Neurologic: no tremors     Psychiatric: alert and oriented x3, calm        Please refer above for cardiac ROS details.        Medical History  Surgical History Family History Social History   Past Medical History:   Diagnosis Date    Endometrial cancer (H)     SVT (supraventricular tachycardia)      Past Surgical History:   Procedure Laterality Date    AS INSERT VAGINAL RADIATION DEVICE      Vaginal brachytherapy    BREAST SURGERY Bilateral     Double Masectomy-BP/IV on L side only please    ESOPHAGOSCOPY, GASTROSCOPY, DUODENOSCOPY (EGD), COMBINED N/A 2/12/2024    Procedure: ESOPHAGOGASTRODUODENOSCOPY with biopsies;  Surgeon: Hiram Burnette MD;  Location: [facility] Main OR    LAPAROSCOPIC HYSTERECTOMY TOTAL, BILATERAL SALPINGO-OOPHORECTOMY, NODE DISSECTION, COMBINED  12/20/2021    Robot-assisted laparoscopic hysterectomy, bilateral salpingo-oophorectomy, sentinel lymph nodes    SKIN CANCER EXCISION       Family History   Problem Relation Age of Onset    Acute Myocardial Infarction Father 47    Prostate Cancer Father     Prostate Cancer Paternal Uncle     Colon Cancer No family hx of         no overy, uterus, breast        Social History     Socioeconomic History    Marital status:      Spouse name: Not on file    Number of children: Not on file    Years of education: Not on file    Highest education level: Not on file   Occupational History    Not on file   Tobacco Use    Smoking status: Never    Smokeless tobacco: Never   Vaping Use    Vaping status: Never Used   Substance and  Sexual Activity    Alcohol use: Never    Drug use: Never    Sexual activity: Not on file   Other Topics Concern    Not on file   Social History Narrative    Not on file     Social Drivers of Health     Financial Resource Strain: Low Risk  (9/18/2024)    [facility]    Financial Resource Strain     Difficulty of Paying Living Expenses: 3     Difficulty of Paying Living Expenses: Not on file   Food Insecurity: No Food Insecurity (9/18/2024)    [facility]    Food Insecurity     Do you worry your food will run out before you are able to buy more?: 1   Transportation Needs: No Transportation Needs (9/18/2024)    [facility]    Transportation Needs     Does lack of transportation keep you from medical appointments?: 1     Does lack of transportation keep you from work, meetings or getting things that you need?: 1   Physical Activity: Not on file   Stress: Not on file   Social Connections: Socially Integrated (9/18/2024)    [facility]    Social Connections     Do you often feel lonely or isolated from those around you?: 0   Interpersonal Safety: Not At Risk (9/16/2022)    Humiliation, Afraid, Rape, and Kick questionnaire     Fear of Current or Ex-Partner: No     Emotionally Abused: No     Physically Abused: No     Sexually Abused: No   Housing Stability: Low Risk  (9/18/2024)    Received from [facility]    Housing Stability     What is your housing situation today?: 1           Medications  Allergies   Current Outpatient Medications   Medication Sig Dispense Refill    anastrozole (ARIMIDEX) 1 MG tablet Take 1 mg by mouth daily.      SUMAtriptan (IMITREX) 25 MG tablet Take 25 mg by mouth at onset of headache      calcium citrate (CITRACAL) 950 (200 Ca) MG tablet Take 1 tablet by mouth 2 times daily  "(Patient not taking: Reported on 3/27/2025)      carvedilol (COREG) 3.125 MG tablet Take 1 tablet (3.125 mg) by mouth 2 times daily (with meals) (Patient not taking: Reported on 3/27/2025) 180 tablet 11    multivitamin w/minerals (THERA-VIT-M) tablet Take 1 tablet by mouth daily (Patient not taking: Reported on 3/27/2025)      VITAMIN D PO  (Patient not taking: Reported on 3/27/2025)         Allergies   Allergen Reactions    Caffeine      Other reaction(s): Other (see comments)    Erythromycin Base [Erythromycin] Unknown    Fish Oil      Other reaction(s): Arrhythmia, Palpitations  Large quantities of it (several days)-also has this with Lanolin based Vitamin D    Magnesium Sulfate Unknown    Other Allergy (See Comments) [External Allergen Needs Reconciliation - See Comment] Other (See Comments)     Magnesium sulfate (given during  labor.)    Terbutaline Other (See Comments)          Lab Results    Chemistry/lipid CBC Cardiac Enzymes/BNP/TSH/INR   Recent Labs   Lab Test 24  0952   CHOL 196   HDL 66   *   TRIG 113     Recent Labs   Lab Test 24  0952 23  1252   * 107*     Recent Labs   Lab Test 23  1132      POTASSIUM 4.6   CHLORIDE 104   CO2 25   *   BUN 15.9   CR 0.76   GFRESTIMATED 90   ISAIAS 9.6     Recent Labs   Lab Test 23  1132 23  1252 12/15/21  1622   CR 0.76 0.65 0.66     No results for input(s): \"A1C\" in the last 59141 hours.       No results for input(s): \"WBC\", \"HGB\", \"HCT\", \"MCV\", \"PLT\" in the last 20768 hours.  No results for input(s): \"HGB\" in the last 13140 hours. No results for input(s): \"TROPONINI\" in the last 56847 hours.  No results for input(s): \"BNP\", \"NTBNPI\", \"NTBNP\" in the last 62860 hours.  Recent Labs   Lab Test 23  1316   TSH 1.18     No results for input(s): \"INR\" in the last 28592 hours.       This note has been dictated using voice recognition software. Any grammatical, typographical, or context distortions are " unintentional and inherent to the software    Kiya Funes PA-C

## 2025-03-27 NOTE — LETTER
3/27/2025    Bruna Torrez MD  [address]  [place] [address]    RE: Justine Viera       Dear Colleague,     I had the pleasure of seeing Justine Viera in the [facility].    HEART CARE ENCOUNTER CONSULTATON NOTE      [facility]  [phone]      Assessment/Recommendations   Assessment:   Non-Ischemic cardiomyopathy, possible stress induced with recovered ejection fraction.: TTE 3/2024 LVEF 60-65%, improved from previously 45-50% 11/2023 TTE, no ischemia on nuclear lexiscan stress test at that time.  Compensated.  PVCs: Carvedilol 3.125 mg twice daily discontinued, 15.8% burden per Holter monitor 10/2020.  Fluttering/palpitations today not correlated on [employer] mobile or exam with arrhythmia. Reportedly may be stress induced with healthcare visit.  Mitral valve prolapse: borderline mitral valve prolapse. There is mild (1+) mitral regurgitation 3/2024      Plan:   She appears well compensated on exam, no PVCs after extended auscultation.  Continue monitoring for increased fatigue, palpitations, heart failure symptoms and with [employer] mobile periodically.  Will keep carvedilol on medication list for her to use as needed with sensation of palpitations patient preference, appropriate given known tolerance of medication.      Follow up in 1 year or sooner as needed     History of Present Illness/Subjective    HPI: Justine Viera is a pleasant 59 year old female with PMHx of ischemic cardiomyopathy with recovered ejection fraction presents for follow up.    Is accompanied by her  Larry today.  Approximately 1 year ago she underwent surgery for breast cancer.  Around this time echocardiogram suggested mildly reduced ejection fraction 45 to 50%.  She was started on metoprolol eventually transition to carvedilol.  Documented history of current PVCs in 2020 Holter monitor, symptomatic with palpitations.  Repeat echo showed normalized LVEF 60-65%.     Patient  reports a few medication adjustments in the last several months.  October/November 2024 she increased her vitamin D dose.  In December she was given okay by Dr. Fam to stop low-dose carvedilol.  Lately she actually felt better by way of palpitations.  Recently she decided to discontinue all of her vitamin D/calcium supplements.  In the last 4 to 5 days she has felt more fluttering of the heart, some PVCs.  He purchased [employer] mobile which has intermittently shown normal sinus rhythm, PVCs associated with palpitation symptoms.  She admits she is feeling nervous to come into the office and see provider today.  She was surprised given her symptoms that K[employer]ia mobile showed normal sinus rhythm without a single PVC today.  She wonders if she could take carvedilol as needed during times of stress with suspected increase palpitation.    She denies lightheadedness, shortness of breath, dyspnea on exertion, orthopnea, PND, chest pain, and lower extremity edema.      Echocardiogram 3/22/2024 Results:  The left ventricle is normal in size. There is normal left ventricular wall  thickness.  Left ventricular systolic function is normal. The visual ejection fraction is  60-65%. No regional wall motion abnormalities noted.     The right ventricle is normal in size and function.  The left atrium is mildly dilated. Right atrial size is normal.  There is borderline mitral valve prolapse. There is mild (1+) mitral  regurgitation.  Right ventricle systolic pressure estimate normal  IVC diameter and respiratory changes fall into an intermediate range  suggesting an RA pressure of 8 mmHg.     When compared with previous study from 11/28/2023 the LV systolic function has  improved.     Physical Examination  Review of Systems   Vitals: /82 (BP Location: Left arm, Patient Position: Sitting, Cuff Size: Adult Regular)   Pulse 72   Resp 16   Wt 60.8 kg (134 lb)   SpO2 97%   BMI 23.18 kg/m    BMI= Body mass index is 23.18  kg/m .  Wt Readings from Last 3 Encounters:   03/27/25 60.8 kg (134 lb)   03/08/24 57.6 kg (127 lb)   01/03/24 57.2 kg (126 lb)           ENT/Mouth: membranes moist, no oral lesions or bleeding gums.      EYES:  no scleral icterus, normal conjunctivae       Chest/Lungs:   lungs are clear to auscultation, no rales or wheezing, equal chest wall expansion    Cardiovascular:   Regular. Normal first and second heart sounds with no murmurs, rubs, or gallops; the  radial and posterior tibial pulses are intact, Jugular venous pressure normal, absent edema bilaterally        Extremities: no cyanosis or clubbing   Skin: no xanthelasma, warm.    Neurologic: no tremors     Psychiatric: alert and oriented x3, calm        Please refer above for cardiac ROS details.        Medical History  Surgical History Family History Social History   Past Medical History:   Diagnosis Date     Endometrial cancer (H)      SVT (supraventricular tachycardia)      Past Surgical History:   Procedure Laterality Date     AS INSERT VAGINAL RADIATION DEVICE      Vaginal brachytherapy     BREAST SURGERY Bilateral     Double Masectomy-BP/IV on L side only please     ESOPHAGOSCOPY, GASTROSCOPY, DUODENOSCOPY (EGD), COMBINED N/A 2/12/2024    Procedure: ESOPHAGOGASTRODUODENOSCOPY with biopsies;  Surgeon: Hiram Burnette MD;  Location: [facility] Main OR     LAPAROSCOPIC HYSTERECTOMY TOTAL, BILATERAL SALPINGO-OOPHORECTOMY, NODE DISSECTION, COMBINED  12/20/2021    Robot-assisted laparoscopic hysterectomy, bilateral salpingo-oophorectomy, sentinel lymph nodes     SKIN CANCER EXCISION       Family History   Problem Relation Age of Onset     Acute Myocardial Infarction Father 47     Prostate Cancer Father      Prostate Cancer Paternal Uncle      Colon Cancer No family hx of         no overy, uterus, breast        Social History     Socioeconomic History     Marital status:      Spouse name: Not on file     Number of children: Not on file     Years  of education: Not on file     Highest education level: Not on file   Occupational History     Not on file   Tobacco Use     Smoking status: Never     Smokeless tobacco: Never   Vaping Use     Vaping status: Never Used   Substance and Sexual Activity     Alcohol use: Never     Drug use: Never     Sexual activity: Not on file   Other Topics Concern     Not on file   Social History Narrative     Not on file     Social Drivers of Health     Financial Resource Strain: Low Risk  (9/18/2024)    [facility]    Financial Resource Strain      Difficulty of Paying Living Expenses: 3      Difficulty of Paying Living Expenses: Not on file   Food Insecurity: No Food Insecurity (9/18/2024)    Received from [facility]    Food Insecurity      Do you worry your food will run out before you are able to buy more?: 1   Transportation Needs: No Transportation Needs (9/18/2024)    [facility]    Transportation Needs      Does lack of transportation keep you from medical appointments?: 1      Does lack of transportation keep you from work, meetings or getting things that you need?: 1   Physical Activity: Not on file   Stress: Not on file   Social Connections: Socially Integrated (9/18/2024)    Received from [facility]    Social Connections      Do you often feel lonely or isolated from those around you?: 0   Interpersonal Safety: Not At Risk (9/16/2022)    Humiliation, Afraid, Rape, and Kick questionnaire      Fear of Current or Ex-Partner: No      Emotionally Abused: No      Physically Abused: No      Sexually Abused: No   Housing Stability: Low Risk  (9/18/2024)    Received from [facility]    Housing Stability      What is your housing situation today?: 1           Medications  Allergies   Current Outpatient Medications   Medication Sig Dispense  "Refill     anastrozole (ARIMIDEX) 1 MG tablet Take 1 mg by mouth daily.       SUMAtriptan (IMITREX) 25 MG tablet Take 25 mg by mouth at onset of headache       calcium citrate (CITRACAL) 950 (200 Ca) MG tablet Take 1 tablet by mouth 2 times daily (Patient not taking: Reported on 3/27/2025)       carvedilol (COREG) 3.125 MG tablet Take 1 tablet (3.125 mg) by mouth 2 times daily (with meals) (Patient not taking: Reported on 3/27/2025) 180 tablet 11     multivitamin w/minerals (THERA-VIT-M) tablet Take 1 tablet by mouth daily (Patient not taking: Reported on 3/27/2025)       VITAMIN D PO  (Patient not taking: Reported on 3/27/2025)         Allergies   Allergen Reactions     Caffeine      Other reaction(s): Other (see comments)     Erythromycin Base [Erythromycin] Unknown     Fish Oil      Other reaction(s): Arrhythmia, Palpitations  Large quantities of it (several days)-also has this with Lanolin based Vitamin D     Magnesium Sulfate Unknown     Other Allergy (See Comments) [External Allergen Needs Reconciliation - See Comment] Other (See Comments)     Magnesium sulfate (given during  labor.)     Terbutaline Other (See Comments)          Lab Results    Chemistry/lipid CBC Cardiac Enzymes/BNP/TSH/INR   Recent Labs   Lab Test 24  0952   CHOL 196   HDL 66   *   TRIG 113     Recent Labs   Lab Test 24  0952 23  1252   * 107*     Recent Labs   Lab Test 23  1132      POTASSIUM 4.6   CHLORIDE 104   CO2 25   *   BUN 15.9   CR 0.76   GFRESTIMATED 90   ISAIAS 9.6     Recent Labs   Lab Test 23  1132 23  1252 12/15/21  1622   CR 0.76 0.65 0.66     No results for input(s): \"A1C\" in the last 21536 hours.       No results for input(s): \"WBC\", \"HGB\", \"HCT\", \"MCV\", \"PLT\" in the last 06602 hours.  No results for input(s): \"HGB\" in the last 05590 hours. No results for input(s): \"TROPONINI\" in the last 91668 hours.  No results for input(s): \"BNP\", \"NTBNPI\", \"NTBNP\" in the " "last 56597 hours.  Recent Labs   Lab Test 11/27/23  1316   TSH 1.18     No results for input(s): \"INR\" in the last 80490 hours.       This note has been dictated using voice recognition software. Any grammatical, typographical, or context distortions are unintentional and inherent to the software    Kiya Funes PA-C                                         Thank you for allowing me to participate in the care of your patient.      Sincerely,     Kiya Funes PA-C     [facility]  cc:   Louis Fam MD  [address]  [place] [address]      "

## (undated) DEVICE — SOL WATER IRRIG 1000ML BOTTLE 2F7114

## (undated) DEVICE — TUBING SUCTION MEDI-VAC 1/4"X20' N620A

## (undated) DEVICE — FORCEP BIOPSY 2.3MM DISP COATED 000388

## (undated) DEVICE — SUCTION MANIFOLD NEPTUNE 2 SYS 1 PORT 702-025-000

## (undated) RX ORDER — LIDOCAINE HYDROCHLORIDE 10 MG/ML
INJECTION, SOLUTION EPIDURAL; INFILTRATION; INTRACAUDAL; PERINEURAL
Status: DISPENSED
Start: 2024-02-12

## (undated) RX ORDER — PROPOFOL 10 MG/ML
INJECTION, EMULSION INTRAVENOUS
Status: DISPENSED
Start: 2024-02-12